# Patient Record
Sex: FEMALE | Race: WHITE | NOT HISPANIC OR LATINO | Employment: OTHER | URBAN - METROPOLITAN AREA
[De-identification: names, ages, dates, MRNs, and addresses within clinical notes are randomized per-mention and may not be internally consistent; named-entity substitution may affect disease eponyms.]

---

## 2017-03-15 ENCOUNTER — HISTORICAL (OUTPATIENT)
Dept: LAB | Facility: HOSPITAL | Age: 42
End: 2017-03-15

## 2018-03-15 ENCOUNTER — HISTORICAL (OUTPATIENT)
Dept: ADMINISTRATIVE | Facility: HOSPITAL | Age: 43
End: 2018-03-15

## 2018-03-15 LAB
ABS NEUT (OLG): 3.56 X10(3)/MCL
ABS NEUT (OLG): 3.59 X10(3)/MCL
ALBUMIN SERPL-MCNC: 3.6 GM/DL (ref 3.4–5)
ALBUMIN/GLOB SERPL: 1 RATIO (ref 1–2)
ALP SERPL-CCNC: 86 UNIT/L (ref 45–117)
ALT SERPL-CCNC: 10 UNIT/L (ref 12–78)
APPEARANCE, UA: ABNORMAL
AST SERPL-CCNC: 16 UNIT/L (ref 15–37)
BACTERIA #/AREA URNS AUTO: ABNORMAL /[HPF]
BASOPHILS NFR BLD MANUAL: 0 %
BASOPHILS NFR BLD MANUAL: 0 %
BILIRUB SERPL-MCNC: 0.3 MG/DL (ref 0.2–1)
BILIRUB UR QL STRIP: NEGATIVE
BILIRUBIN DIRECT+TOT PNL SERPL-MCNC: 0.1 MG/DL
BILIRUBIN DIRECT+TOT PNL SERPL-MCNC: 0.2 MG/DL
BUN SERPL-MCNC: 10 MG/DL (ref 7–18)
CALCIUM SERPL-MCNC: 8.9 MG/DL (ref 8.5–10.1)
CD3+CD4+ CELLS # SPEC: 553 UNIT/L (ref 589–1505)
CD3+CD4+ CELLS NFR BLD: 11.7 % (ref 31–59)
CHLORIDE SERPL-SCNC: 102 MMOL/L (ref 98–107)
CHOLEST SERPL-MCNC: 149 MG/DL
CHOLEST/HDLC SERPL: 3.5 {RATIO} (ref 0–4.4)
CO2 SERPL-SCNC: 28 MMOL/L (ref 21–32)
COLOR UR: YELLOW
CREAT SERPL-MCNC: 0.8 MG/DL (ref 0.6–1.3)
DEPRECATED CALCIDIOL+CALCIFEROL SERPL-MC: 17.42 NG/ML (ref 30–80)
EOSINOPHIL NFR BLD MANUAL: 1 %
EOSINOPHIL NFR BLD MANUAL: 1 %
ERYTHROCYTE [DISTWIDTH] IN BLOOD BY AUTOMATED COUNT: 13.2 % (ref 11.5–14.5)
ERYTHROCYTE [DISTWIDTH] IN BLOOD BY AUTOMATED COUNT: 13.3 % (ref 11.5–14.5)
EST. AVERAGE GLUCOSE BLD GHB EST-MCNC: 105 MG/DL
GLOBULIN SER-MCNC: 5 GM/ML (ref 2.3–3.5)
GLUCOSE (UA): NORMAL
GLUCOSE SERPL-MCNC: 80 MG/DL (ref 74–106)
GRANULOCYTES NFR BLD MANUAL: 42 % (ref 43–75)
GRANULOCYTES NFR BLD MANUAL: 42 % (ref 43–75)
HBA1C MFR BLD: 5.3 % (ref 4.2–6.3)
HCT VFR BLD AUTO: 40.9 % (ref 35–46)
HCT VFR BLD AUTO: 41 % (ref 35–46)
HCV AB SERPL QL IA: NONREACTIVE
HDLC SERPL-MCNC: 42 MG/DL
HGB BLD-MCNC: 13.1 GM/DL (ref 12–16)
HGB BLD-MCNC: 13.2 GM/DL (ref 12–16)
HGB UR QL STRIP: NEGATIVE
HYALINE CASTS #/AREA URNS LPF: ABNORMAL /[LPF]
KETONES UR QL STRIP: ABNORMAL
LDLC SERPL CALC-MCNC: 84 MG/DL (ref 0–130)
LEUKOCYTE ESTERASE UR QL STRIP: 250 LEU/UL
LYMPHOCYTES # BLD AUTO: 4730 UNIT/L (ref 1260–5520)
LYMPHOCYTES NFR BLD MANUAL: 55 % (ref 20.5–51.1)
LYMPHOCYTES NFR BLD MANUAL: 55 % (ref 20.5–51.1)
LYMPHOCYTES NFR LN MANUAL: 55 % (ref 28–48)
LYMPHOMA - T-CELL MARKERS SPEC-IMP: ABNORMAL
MCH RBC QN AUTO: 28.7 PG (ref 26–34)
MCH RBC QN AUTO: 28.9 PG (ref 26–34)
MCHC RBC AUTO-ENTMCNC: 32 GM/DL (ref 31–37)
MCHC RBC AUTO-ENTMCNC: 32.3 GM/DL (ref 31–37)
MCV RBC AUTO: 89.7 FL (ref 80–100)
MCV RBC AUTO: 89.9 FL (ref 80–100)
MONOCYTES NFR BLD MANUAL: 2 % (ref 2–9)
MONOCYTES NFR BLD MANUAL: 2 % (ref 2–9)
MUCOUS THREADS URNS QL MICRO: SLIGHT
NITRITE UR QL STRIP: ABNORMAL
PH UR STRIP: 7 [PH] (ref 4.5–8)
PLATELET # BLD AUTO: 220 X10(3)/MCL (ref 130–400)
PLATELET # BLD AUTO: 221 X10(3)/MCL (ref 130–400)
PLATELET # BLD EST: NORMAL 10*3/UL
PLATELET # BLD EST: NORMAL 10*3/UL
PMV BLD AUTO: 10 FL (ref 7.4–10.4)
PMV BLD AUTO: 10.1 FL (ref 7.4–10.4)
POTASSIUM SERPL-SCNC: 4 MMOL/L (ref 3.5–5.1)
PROT SERPL-MCNC: 8.6 GM/DL (ref 6.4–8.2)
PROT UR QL STRIP: 10 MG/DL
RBC # BLD AUTO: 4.56 X10(6)/MCL (ref 4–5.2)
RBC # BLD AUTO: 4.56 X10(6)/MCL (ref 4–5.2)
RBC #/AREA URNS AUTO: ABNORMAL /[HPF]
RBC MORPH BLD: NORMAL
RBC MORPH BLD: NORMAL
SODIUM SERPL-SCNC: 139 MMOL/L (ref 136–145)
SP GR UR STRIP: 1.01 (ref 1–1.03)
SQUAMOUS #/AREA URNS LPF: >100 /[LPF]
T PALLIDUM AB SER QL: NONREACTIVE
TRIGL SERPL-MCNC: 116 MG/DL
TSH SERPL-ACNC: 1.27 MIU/L (ref 0.36–3.74)
UROBILINOGEN UR STRIP-ACNC: NORMAL
VLDLC SERPL CALC-MCNC: 23 MG/DL
WBC # BLD AUTO: 8600 /MM3 (ref 4500–11500)
WBC # SPEC AUTO: 8.5 X10(3)/MCL (ref 4.5–11)
WBC # SPEC AUTO: 8.6 X10(3)/MCL (ref 4.5–11)
WBC #/AREA URNS AUTO: ABNORMAL /HPF

## 2019-05-09 ENCOUNTER — HISTORICAL (OUTPATIENT)
Dept: RADIOLOGY | Facility: HOSPITAL | Age: 44
End: 2019-05-09

## 2019-08-29 ENCOUNTER — HISTORICAL (OUTPATIENT)
Dept: LAB | Facility: HOSPITAL | Age: 44
End: 2019-08-29

## 2019-11-20 ENCOUNTER — HISTORICAL (OUTPATIENT)
Dept: RADIOLOGY | Facility: HOSPITAL | Age: 44
End: 2019-11-20

## 2020-03-19 ENCOUNTER — HISTORICAL (OUTPATIENT)
Dept: RESPIRATORY THERAPY | Facility: HOSPITAL | Age: 45
End: 2020-03-19

## 2020-10-28 ENCOUNTER — HISTORICAL (OUTPATIENT)
Dept: RADIOLOGY | Facility: HOSPITAL | Age: 45
End: 2020-10-28

## 2022-04-07 ENCOUNTER — HISTORICAL (OUTPATIENT)
Dept: ADMINISTRATIVE | Facility: HOSPITAL | Age: 47
End: 2022-04-07
Payer: MEDICAID

## 2022-04-24 VITALS
BODY MASS INDEX: 38.28 KG/M2 | OXYGEN SATURATION: 98 % | WEIGHT: 195 LBS | SYSTOLIC BLOOD PRESSURE: 124 MMHG | HEIGHT: 60 IN | DIASTOLIC BLOOD PRESSURE: 85 MMHG

## 2022-04-28 NOTE — PROGRESS NOTES
Urine C & S confirm E Coli UTI, resistant to Cipro.  Pt notified, d/c cipro. Replace with Macrobid 100mg bid x 7 days.  Drink lots of water, wipe front to back, urinate before & after sex.  Pt voiced understanding.  Rx sent to pt's pharm of choice.
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
## 2022-07-15 ENCOUNTER — TELEPHONE (OUTPATIENT)
Dept: INFECTIOUS DISEASES | Facility: CLINIC | Age: 47
End: 2022-07-15
Payer: MEDICAID

## 2022-07-15 NOTE — TELEPHONE ENCOUNTER
Called and spoke with patient to schedule an appointment with Amanda. Appointment scheduled for 07/18/2022 at 8:50am. Patient voiced understanding and stated she will call the clinic back if her sister is not able to bring her to the appointment.

## 2022-07-18 ENCOUNTER — OFFICE VISIT (OUTPATIENT)
Dept: INFECTIOUS DISEASES | Facility: CLINIC | Age: 47
End: 2022-07-18
Payer: MEDICAID

## 2022-07-18 VITALS
HEART RATE: 86 BPM | HEIGHT: 60 IN | TEMPERATURE: 98 F | WEIGHT: 185.69 LBS | DIASTOLIC BLOOD PRESSURE: 79 MMHG | RESPIRATION RATE: 16 BRPM | SYSTOLIC BLOOD PRESSURE: 114 MMHG | BODY MASS INDEX: 36.46 KG/M2

## 2022-07-18 DIAGNOSIS — R56.9 SEIZURE-LIKE ACTIVITY: ICD-10-CM

## 2022-07-18 DIAGNOSIS — Z12.39 ENCOUNTER FOR SCREENING FOR MALIGNANT NEOPLASM OF BREAST, UNSPECIFIED SCREENING MODALITY: ICD-10-CM

## 2022-07-18 DIAGNOSIS — B20 HIV DISEASE: Primary | ICD-10-CM

## 2022-07-18 DIAGNOSIS — Z12.11 COLON CANCER SCREENING: ICD-10-CM

## 2022-07-18 LAB
ALBUMIN SERPL-MCNC: 4 GM/DL (ref 3.5–5)
ALBUMIN/GLOB SERPL: 1.1 RATIO (ref 1.1–2)
ALP SERPL-CCNC: 80 UNIT/L (ref 40–150)
ALT SERPL-CCNC: 12 UNIT/L (ref 0–55)
APPEARANCE UR: ABNORMAL
AST SERPL-CCNC: 15 UNIT/L (ref 5–34)
BACTERIA #/AREA URNS AUTO: ABNORMAL /HPF
BASOPHILS # BLD AUTO: 0.02 X10(3)/MCL (ref 0–0.2)
BASOPHILS NFR BLD AUTO: 0.3 %
BILIRUB UR QL STRIP.AUTO: NEGATIVE MG/DL
BILIRUBIN DIRECT+TOT PNL SERPL-MCNC: 0.2 MG/DL
BUN SERPL-MCNC: 13.4 MG/DL (ref 7–18.7)
C TRACH DNA SPEC QL NAA+PROBE: NOT DETECTED
CALCIUM SERPL-MCNC: 9.2 MG/DL (ref 8.4–10.2)
CHLORIDE SERPL-SCNC: 99 MMOL/L (ref 98–107)
CHOLEST SERPL-MCNC: 155 MG/DL
CHOLEST/HDLC SERPL: 3 {RATIO} (ref 0–5)
CO2 SERPL-SCNC: 31 MMOL/L (ref 22–29)
COLOR UR AUTO: ABNORMAL
CREAT SERPL-MCNC: 0.86 MG/DL (ref 0.55–1.02)
DEPRECATED CALCIDIOL+CALCIFEROL SERPL-MC: 21.5 NG/ML (ref 30–80)
EOSINOPHIL # BLD AUTO: 0.03 X10(3)/MCL (ref 0–0.9)
EOSINOPHIL NFR BLD AUTO: 0.5 %
ERYTHROCYTE [DISTWIDTH] IN BLOOD BY AUTOMATED COUNT: 14.1 % (ref 11.5–17)
EST. AVERAGE GLUCOSE BLD GHB EST-MCNC: 105.4 MG/DL
GLOBULIN SER-MCNC: 3.7 GM/DL (ref 2.4–3.5)
GLUCOSE SERPL-MCNC: 94 MG/DL (ref 74–100)
GLUCOSE UR QL STRIP.AUTO: NORMAL MG/DL
HBA1C MFR BLD: 5.3 %
HCT VFR BLD AUTO: 39 % (ref 37–47)
HCV AB SERPL QL IA: NONREACTIVE
HDLC SERPL-MCNC: 48 MG/DL (ref 35–60)
HGB BLD-MCNC: 12.3 GM/DL (ref 12–16)
HYALINE CASTS #/AREA URNS LPF: ABNORMAL /LPF
IMM GRANULOCYTES # BLD AUTO: 0.01 X10(3)/MCL (ref 0–0.04)
IMM GRANULOCYTES NFR BLD AUTO: 0.2 %
KETONES UR QL STRIP.AUTO: ABNORMAL MG/DL
LDLC SERPL CALC-MCNC: 87 MG/DL (ref 50–140)
LEUKOCYTE ESTERASE UR QL STRIP.AUTO: 250 UNIT/L
LYMPHOCYTES # BLD AUTO: 3.6 X10(3)/MCL (ref 0.6–4.6)
LYMPHOCYTES NFR BLD AUTO: 58.8 %
MCH RBC QN AUTO: 28.1 PG (ref 27–31)
MCHC RBC AUTO-ENTMCNC: 31.5 MG/DL (ref 33–36)
MCV RBC AUTO: 89 FL (ref 80–94)
MONOCYTES # BLD AUTO: 0.35 X10(3)/MCL (ref 0.1–1.3)
MONOCYTES NFR BLD AUTO: 5.7 %
MUCOUS THREADS URNS QL MICRO: ABNORMAL /LPF
N GONORRHOEA DNA SPEC QL NAA+PROBE: NOT DETECTED
NEUTROPHILS # BLD AUTO: 2.1 X10(3)/MCL (ref 2.1–9.2)
NEUTROPHILS NFR BLD AUTO: 34.5 %
NITRITE UR QL STRIP.AUTO: ABNORMAL
NRBC BLD AUTO-RTO: 0 %
PH UR STRIP.AUTO: 6 [PH]
PLATELET # BLD AUTO: 219 X10(3)/MCL (ref 130–400)
PMV BLD AUTO: 9.7 FL (ref 7.4–10.4)
POTASSIUM SERPL-SCNC: 4.7 MMOL/L (ref 3.5–5.1)
PROT SERPL-MCNC: 7.7 GM/DL (ref 6.4–8.3)
PROT UR QL STRIP.AUTO: ABNORMAL MG/DL
RBC # BLD AUTO: 4.38 X10(6)/MCL (ref 4.2–5.4)
RBC #/AREA URNS AUTO: ABNORMAL /HPF
RBC UR QL AUTO: ABNORMAL UNIT/L
SODIUM SERPL-SCNC: 138 MMOL/L (ref 136–145)
SP GR UR STRIP.AUTO: 1.03
SQUAMOUS #/AREA URNS LPF: ABNORMAL /HPF
T PALLIDUM AB SER QL: NONREACTIVE
TRIGL SERPL-MCNC: 101 MG/DL (ref 37–140)
TSH SERPL-ACNC: 2.45 UIU/ML (ref 0.35–4.94)
UROBILINOGEN UR STRIP-ACNC: NORMAL MG/DL
VLDLC SERPL CALC-MCNC: 20 MG/DL
WBC # SPEC AUTO: 6.1 X10(3)/MCL (ref 4.5–11.5)
WBC #/AREA URNS AUTO: ABNORMAL /HPF

## 2022-07-18 PROCEDURE — 81001 URINALYSIS AUTO W/SCOPE: CPT | Performed by: NURSE PRACTITIONER

## 2022-07-18 PROCEDURE — 1159F PR MEDICATION LIST DOCUMENTED IN MEDICAL RECORD: ICD-10-PCS | Mod: CPTII,,, | Performed by: NURSE PRACTITIONER

## 2022-07-18 PROCEDURE — 99214 OFFICE O/P EST MOD 30 MIN: CPT | Mod: S$PBB,,, | Performed by: NURSE PRACTITIONER

## 2022-07-18 PROCEDURE — 99214 OFFICE O/P EST MOD 30 MIN: CPT | Mod: PBBFAC | Performed by: NURSE PRACTITIONER

## 2022-07-18 PROCEDURE — 86361 T CELL ABSOLUTE COUNT: CPT | Performed by: NURSE PRACTITIONER

## 2022-07-18 PROCEDURE — 3074F PR MOST RECENT SYSTOLIC BLOOD PRESSURE < 130 MM HG: ICD-10-PCS | Mod: CPTII,,, | Performed by: NURSE PRACTITIONER

## 2022-07-18 PROCEDURE — 1160F RVW MEDS BY RX/DR IN RCRD: CPT | Mod: CPTII,,, | Performed by: NURSE PRACTITIONER

## 2022-07-18 PROCEDURE — 87591 N.GONORRHOEAE DNA AMP PROB: CPT | Performed by: NURSE PRACTITIONER

## 2022-07-18 PROCEDURE — 1159F MED LIST DOCD IN RCRD: CPT | Mod: CPTII,,, | Performed by: NURSE PRACTITIONER

## 2022-07-18 PROCEDURE — 80061 LIPID PANEL: CPT | Performed by: NURSE PRACTITIONER

## 2022-07-18 PROCEDURE — 1160F PR REVIEW ALL MEDS BY PRESCRIBER/CLIN PHARMACIST DOCUMENTED: ICD-10-PCS | Mod: CPTII,,, | Performed by: NURSE PRACTITIONER

## 2022-07-18 PROCEDURE — 80053 COMPREHEN METABOLIC PANEL: CPT | Performed by: NURSE PRACTITIONER

## 2022-07-18 PROCEDURE — 87491 CHLMYD TRACH DNA AMP PROBE: CPT | Performed by: NURSE PRACTITIONER

## 2022-07-18 PROCEDURE — 85025 COMPLETE CBC W/AUTO DIFF WBC: CPT | Performed by: NURSE PRACTITIONER

## 2022-07-18 PROCEDURE — 3078F PR MOST RECENT DIASTOLIC BLOOD PRESSURE < 80 MM HG: ICD-10-PCS | Mod: CPTII,,, | Performed by: NURSE PRACTITIONER

## 2022-07-18 PROCEDURE — 0219U NFCT AGT HIV GNRJ SEQ ALYS: CPT | Performed by: NURSE PRACTITIONER

## 2022-07-18 PROCEDURE — 82306 VITAMIN D 25 HYDROXY: CPT | Performed by: NURSE PRACTITIONER

## 2022-07-18 PROCEDURE — 3074F SYST BP LT 130 MM HG: CPT | Mod: CPTII,,, | Performed by: NURSE PRACTITIONER

## 2022-07-18 PROCEDURE — 84443 ASSAY THYROID STIM HORMONE: CPT | Performed by: NURSE PRACTITIONER

## 2022-07-18 PROCEDURE — 86780 TREPONEMA PALLIDUM: CPT | Performed by: NURSE PRACTITIONER

## 2022-07-18 PROCEDURE — 99214 PR OFFICE/OUTPT VISIT, EST, LEVL IV, 30-39 MIN: ICD-10-PCS | Mod: S$PBB,,, | Performed by: NURSE PRACTITIONER

## 2022-07-18 PROCEDURE — 3008F BODY MASS INDEX DOCD: CPT | Mod: CPTII,,, | Performed by: NURSE PRACTITIONER

## 2022-07-18 PROCEDURE — 3008F PR BODY MASS INDEX (BMI) DOCUMENTED: ICD-10-PCS | Mod: CPTII,,, | Performed by: NURSE PRACTITIONER

## 2022-07-18 PROCEDURE — 86803 HEPATITIS C AB TEST: CPT | Performed by: NURSE PRACTITIONER

## 2022-07-18 PROCEDURE — 36415 COLL VENOUS BLD VENIPUNCTURE: CPT | Performed by: NURSE PRACTITIONER

## 2022-07-18 PROCEDURE — 83036 HEMOGLOBIN GLYCOSYLATED A1C: CPT | Performed by: NURSE PRACTITIONER

## 2022-07-18 PROCEDURE — 3078F DIAST BP <80 MM HG: CPT | Mod: CPTII,,, | Performed by: NURSE PRACTITIONER

## 2022-07-18 PROCEDURE — 87077 CULTURE AEROBIC IDENTIFY: CPT | Performed by: NURSE PRACTITIONER

## 2022-07-18 RX ORDER — QUETIAPINE FUMARATE 300 MG/1
600 TABLET, FILM COATED ORAL NIGHTLY
COMMUNITY
Start: 2022-06-28

## 2022-07-18 RX ORDER — ALPRAZOLAM 2 MG/1
2 TABLET ORAL 2 TIMES DAILY PRN
COMMUNITY
Start: 2022-07-16 | End: 2022-12-17 | Stop reason: ALTCHOICE

## 2022-07-18 RX ORDER — DIVALPROEX SODIUM 500 MG/1
500 TABLET, FILM COATED, EXTENDED RELEASE ORAL 3 TIMES DAILY
COMMUNITY
Start: 2022-07-16

## 2022-07-18 RX ORDER — ABACAVIR SULFATE, DOLUTEGRAVIR SODIUM, LAMIVUDINE 600; 50; 300 MG/1; MG/1; MG/1
1 TABLET, FILM COATED ORAL DAILY
COMMUNITY
Start: 2022-01-31 | End: 2022-07-18 | Stop reason: SDUPTHER

## 2022-07-18 RX ORDER — HYDROXYZINE HYDROCHLORIDE 25 MG/1
TABLET, FILM COATED ORAL
COMMUNITY
Start: 2022-06-20 | End: 2022-12-17 | Stop reason: ALTCHOICE

## 2022-07-18 RX ORDER — ABACAVIR SULFATE, DOLUTEGRAVIR SODIUM, LAMIVUDINE 600; 50; 300 MG/1; MG/1; MG/1
1 TABLET, FILM COATED ORAL DAILY
Qty: 30 TABLET | Refills: 3 | Status: SHIPPED | OUTPATIENT
Start: 2022-07-18 | End: 2023-08-17 | Stop reason: SDUPTHER

## 2022-07-18 NOTE — PROGRESS NOTES
Subjective:       Patient ID: Amy Hanson is a 46 y.o. female.    Chief Complaint: b20 f/u    7/18/22  Amy is a 47 yo HIV + WF presenting today for HIV f/u visit.  She tells me that she has been off Triumeq x 3 months now due to missed appointments.  She states that she was going to the Methadone clinic and was having trouble with transportation getting her here as well.  She is no longer going to Methadone clinic & will be able to attend visits as scheduled.  She has not followed up with Dr. Hooker for cervical pap.  Amenable to having pap smear here, but she is currently on menstrual cycle.  Will plan for pap at next visit in 6 weeks.  Past due for MMG, order placed to schedule at [facility].  She has no family history of colon cancer, no rectal bleeding.  Cologuard ordered for colon cancer screening.  She is not currently taking vitamin d, will check level today.  No new sexual partners, same male partner x 5 years.  She tells me that she has new onset seizure like activity since last visit with me. Will seek PCP evaluation for same ASAP.  All questions answered & concerns addressed.     8/11/21  Amy is a 46 yo HIV + WF evaluated by audio-video telemedicine.  He/She is located at home, and I, the provider, am located at [facility].  We are both located in [place], the [facility] in which I am licensed to practice.  The patient consents to telemedicine visit and is the only individual online.  The patient (or patient's representative) stated that they understood and accepted the privacy and security risks to their information at their location.   She reports 100% adherent to Triumeq, tolerates well with viral suppression. Last labs 4/21 VL <20, Cd4 721.  She agrees to go to [facility] for updated labs today.  She tells me that she is having urinary symptoms of UTI to include frequency, urgency, burning, cloudy & foul smelling urine.  Will order urine with culture & sensitivity today & treat presumptively for  simple UTI.  Voiced appreciation.  She remains in care with PCP SURESH Camejo NP.  She has not been able to see Dr. Hooker & states that she would prefer to have pap smear done at my office. Will plan to perform next visit with face to face scheduling.  Will order MMG to be scheduled at [facility] as well. She is taking vitamin d as well.  She has not yet taken COVID vaccination.  Questions answered & concerns addressed.  She tells me that she will go to [facility] today for mRNA 1st dose vaccine.   Face to face time spent with patient exceeds 20 minutes, over 50% of which was used for education and counseling regarding medical conditions, current medications including risk/benefit and side effects/adverse events, over the counter medications-uses/doses, home self-care and contact precautions, and red flags and indications for immediate medical attention.  The patient is receptive, expresses understanding and is agreeable to plan. All questions answered.     4/21/21  Amy is a 46 yo WF presenting today for HIV return visit, last labs > 1 yr ago.  She tells me that she has been taking Triumeq daily, but ran out a couple of days ago.  She tolerates it well.  Remains in care with PCP SURESH Camejo NP & states that she was taken off Metformin as her Hgb A1c normalized.  She has not seen her GYN  Dr. Hooker in some years, will call for an appointment.  If she is not scheduled or seen by next visit, will plan for pap smear here in office & order MMG.  Pt voiced understanding. She agrees to see a local ophth for routine eye exam & correction.  She denies any new sexual partners, in same relationship x 3 years.  She tells me that transportation has been her biggest obstacle with getting here this past year as medical transportation & family have not been reliable.  She now has a vehicle, working on getting it insured.  Questions answered & concerns addressed regarding COVID vaccination.  Will consider.      Review of  Systems   Constitutional: Negative.    HENT: Negative.    Eyes: Negative.    Respiratory: Negative.    Cardiovascular: Negative.    Gastrointestinal: Negative.    Genitourinary: Negative.    Integumentary:  Negative.   Neurological: Positive for seizures.   Psychiatric/Behavioral: Negative.          Objective:      Physical Exam  Vitals reviewed.   Constitutional:       General: She is not in acute distress.     Appearance: Normal appearance. She is not toxic-appearing.   HENT:      Mouth/Throat:      Mouth: Mucous membranes are moist.      Pharynx: Oropharynx is clear.   Eyes:      Conjunctiva/sclera: Conjunctivae normal.   Cardiovascular:      Rate and Rhythm: Normal rate and regular rhythm.   Pulmonary:      Effort: Pulmonary effort is normal. No respiratory distress.      Breath sounds: Normal breath sounds.   Abdominal:      General: Abdomen is flat. Bowel sounds are normal.      Palpations: Abdomen is soft.   Musculoskeletal:         General: Normal range of motion.      Cervical back: Normal range of motion.   Lymphadenopathy:      Cervical: No cervical adenopathy.   Skin:     General: Skin is warm and dry.   Neurological:      General: No focal deficit present.      Mental Status: She is alert and oriented to person, place, and time. Mental status is at baseline.   Psychiatric:         Mood and Affect: Mood normal.         Behavior: Behavior normal.         Assessment:       Problem List Items Addressed This Visit    None     Visit Diagnoses     HIV disease    -  Primary    Relevant Medications    TRIUMEQ 600- mg Tab    Other Relevant Orders    HIV-1 RNA, Quantitative, PCR with Reflex to Genotype    CD4 T-[place] Cells    Urinalysis    Vitamin D    TSH    Hemoglobin A1C    Hepatitis C Antibody    SYPHILIS ANTIBODY (WITH REFLEX RPR)    Lipid Panel    Chlamydia/GC, PCR    Comprehensive Metabolic Panel    CBC Auto Differential    Quantiferon Gold TB    Colon cancer screening        Relevant Orders     Cologuard Screening (Multitarget Stool DNA)    Encounter for screening for malignant neoplasm of breast, unspecified screening modality        Relevant Orders    Mammo Digital Screening Bilat    Seizure-like activity              Plan:       HIV disease  -     HIV-1 RNA, Quantitative, PCR with Reflex to Genotype; Future; Expected date: 07/18/2022  -     CD4 T-[place] Cells; Future; Expected date: 07/18/2022  -     Urinalysis; Future; Expected date: 07/18/2022  -     Vitamin D; Future; Expected date: 07/18/2022  -     TSH; Future; Expected date: 07/18/2022  -     Hemoglobin A1C; Future; Expected date: 07/18/2022  -     Hepatitis C Antibody; Future; Expected date: 07/18/2022  -     SYPHILIS ANTIBODY (WITH REFLEX RPR); Future; Expected date: 07/18/2022  -     Lipid Panel; Future; Expected date: 07/18/2022  -     Chlamydia/GC, PCR; Future; Expected date: 07/18/2022  -     Comprehensive Metabolic Panel; Future; Expected date: 07/18/2022  -     CBC Auto Differential; Future; Expected date: 07/18/2022  -     Quantiferon Gold TB; Future; Expected date: 07/18/2022  -     TRIUMEQ 600- mg Tab; Take 1 tablet by mouth once daily.  Dispense: 30 tablet; Refill: 3    Adherence and sexual health counseling done.  Use condoms for all sexual encounters.  Blood precautions.  Resume Triumeq 1 po daily as prescribed.  Labs as ordered.  RTC 6 weeks with Amanda.   Pap smears next visit.   Contact PCP asap for evaluation of seizure-like activity.     Wellness:  Cervical pap: ~2019 Dr. Hooker  Anal pap: 9/2019 NIL  Cervical CT/GC: 4/2021 Neg, 7/22  Anal CT/GC: 9/2019 Neg  Oral CT/GC: 9/2019 Neg  Eye exam: 9/2019  MMG: ~2019 [place]  DEXA: N/A  Colon cancer screen:     Colon cancer screening  -     Cologuard Screening (Multitarget Stool DNA); Future; Expected date: 07/18/2022    Encounter for screening for malignant neoplasm of breast, unspecified screening modality  -     Mammo Digital Screening Bilat; Future; Expected date:  08/18/2022  To be scheduled at [facility] per pt request.

## 2022-07-20 LAB
BACTERIA UR CULT: ABNORMAL
GAMMA INTERFERON BACKGROUND BLD IA-ACNC: 0.03 IU/ML
HIV1 RNA # PLAS NAA DL=20: ABNORMAL COPIES/ML
M TB IFN-G BLD-IMP: NEGATIVE
M TB IFN-G CD4+ BCKGRND COR BLD-ACNC: 0 IU/ML
M TB IFN-G CD4+CD8+ BCKGRND COR BLD-ACNC: 0.01 IU/ML
MITOGEN IGNF BCKGRD COR BLD-ACNC: 9.97 IU/ML

## 2022-07-21 DIAGNOSIS — N30.00 ACUTE CYSTITIS WITHOUT HEMATURIA: Primary | ICD-10-CM

## 2022-07-21 RX ORDER — NITROFURANTOIN 25; 75 MG/1; MG/1
100 CAPSULE ORAL 2 TIMES DAILY
Qty: 14 CAPSULE | Refills: 0 | Status: SHIPPED | OUTPATIENT
Start: 2022-07-21 | End: 2022-07-28

## 2022-07-21 NOTE — PROGRESS NOTES
Urine culture positive for E.coli infection.  She will need treatment with Macrobid BID for 7 days. Please notify pt of results.  Encourage her to drink lots of water, do not hold urine for extended time periods, wipe front to back, and urinate before and after sex.  Please encourage condom use as well, HIV viral is 110,000 and she is infectious to anyone that she has intercourse with or is exposed to her blood.  Take HIV medications as prescribed.      Where should I send the antibiotic for bladder infection?

## 2022-07-22 LAB
AGE: 47
CD3+CD4+ CELLS # BLD: 58.8 % (ref 28–48)
CD3+CD4+ CELLS # SPEC: 555.95 UNIT/L (ref 589–1505)
CD3+CD4+ CELLS NFR BLD: 15.5 %
LYMPHOCYTES # BLD AUTO: 3586.8 X10(3)/MCL (ref 1260–5520)
LYMPHOMA - T-CELL MARKERS SPEC-IMP: ABNORMAL
WBC # BLD AUTO: 6100 /MM3 (ref 4500–11500)

## 2022-07-29 LAB
ABACAVIR ISLT GENOTYP: NORMAL
ATAZANAVIR+RITONAVIR ISLT GENOTYP: NORMAL
BICTEGRAVIR ISLT GENOTYP: NORMAL
CABOTEGRAVIR ISLT GENOTYP: NORMAL
DARUNAVIR+RITONAVIR ISLT GENOTYP: NORMAL
DIDANOSINE ISLT GENOTYP: NORMAL
DOLUTEGRAVIR ISLT GENOTYP: NORMAL
DORAVIRINE ISLT GENOTYP: NORMAL
EFAVIRENZ ISLT GENOTYP: NORMAL
ELVITEGRAVIR ISLT GENOTYP: NORMAL
EMTRICITABINE ISLT GENOTYP: NORMAL
ETRAVIRINE ISLT GENOTYP: NORMAL
FOSAMPRENAVIR+RITONAVIR ISLT GENOTYP: NORMAL
HIV 1 RNA GENOTYPE ISLT: NORMAL
HIV 1 RNA RT + PR + IN MUT DET PLAS SEQ: NORMAL
HIV NNRTI GENE MUT DET ISLT: NORMAL
HIV NRTI GENE MUT DET ISLT: NORMAL
HIV PI GENE MUT DET ISLT: NORMAL
HIV1 INTEGRASE GENE MUT DET ISLT: NORMAL
INDINAVIR+RITONAVIR ISLT GENOTYP: NORMAL
LAB AOE PNL PATIENT: NORMAL
LAMIVUDINE ISLT GENOTYP: NORMAL
LOPINAVIR+RITONAVIR ISLT GENOTYP: NORMAL
M INTEGRASE FAILED CODONS: NORMAL
M PROTEASE FAILED CODONS: NORMAL
M REVERSE TRANSCRIPTASE FAILED CODONS: NORMAL
NELFINAVIR ISLT GENOTYP: NORMAL
NEVIRAPINE ISLT GENOTYP: NORMAL
RALTEGRAVIR ISLT GENOTYP: NORMAL
RILPIVIRINE ISLT GENOTYP: NORMAL
SAQUINAVIR+RITONAVIR ISLT GENOTYP: NORMAL
STAVUDINE ISLT GENOTYP: NORMAL
TENOFOVIR ISLT GENOTYP: NORMAL
TIPRANAVIR+RITONAVIR ISLT GENOTYP: NORMAL
ZIDOVUDINE ISLT GENOTYP: NORMAL

## 2022-08-23 ENCOUNTER — HOSPITAL ENCOUNTER (EMERGENCY)
Facility: HOSPITAL | Age: 47
Discharge: HOME | End: 2022-08-23
Attending: EMERGENCY MEDICINE
Payer: MEDICAID

## 2022-08-23 VITALS
BODY MASS INDEX: 35.34 KG/M2 | HEART RATE: 84 BPM | DIASTOLIC BLOOD PRESSURE: 91 MMHG | TEMPERATURE: 98 F | WEIGHT: 180 LBS | SYSTOLIC BLOOD PRESSURE: 152 MMHG | HEIGHT: 60 IN | OXYGEN SATURATION: 100 % | RESPIRATION RATE: 18 BRPM

## 2022-08-23 DIAGNOSIS — E86.0 DEHYDRATION: ICD-10-CM

## 2022-08-23 DIAGNOSIS — A08.4 VIRAL GASTROENTERITIS: Primary | ICD-10-CM

## 2022-08-23 DIAGNOSIS — U07.1 COVID-19: ICD-10-CM

## 2022-08-23 LAB
ALBUMIN SERPL-MCNC: 3.7 GM/DL (ref 3.5–5)
ALBUMIN/GLOB SERPL: 0.9 RATIO (ref 1.1–2)
ALP SERPL-CCNC: 94 UNIT/L (ref 40–150)
ALT SERPL-CCNC: 13 UNIT/L (ref 0–55)
APPEARANCE UR: ABNORMAL
AST SERPL-CCNC: 17 UNIT/L (ref 5–34)
BACTERIA #/AREA URNS AUTO: ABNORMAL /HPF
BASOPHILS # BLD AUTO: 0.01 X10(3)/MCL (ref 0–0.2)
BASOPHILS NFR BLD AUTO: 0.2 %
BILIRUB UR QL STRIP.AUTO: NEGATIVE MG/DL
BILIRUBIN DIRECT+TOT PNL SERPL-MCNC: 0.4 MG/DL
BUN SERPL-MCNC: 9 MG/DL (ref 7–18.7)
CALCIUM SERPL-MCNC: 9.4 MG/DL (ref 8.4–10.2)
CHLORIDE SERPL-SCNC: 106 MMOL/L (ref 98–107)
CO2 SERPL-SCNC: 21 MMOL/L (ref 22–29)
COLOR UR AUTO: YELLOW
CREAT SERPL-MCNC: 0.84 MG/DL (ref 0.55–1.02)
EOSINOPHIL # BLD AUTO: 0 X10(3)/MCL (ref 0–0.9)
EOSINOPHIL NFR BLD AUTO: 0 %
ERYTHROCYTE [DISTWIDTH] IN BLOOD BY AUTOMATED COUNT: 14.8 % (ref 11.5–17)
FLUAV AG UPPER RESP QL IA.RAPID: NOT DETECTED
FLUBV AG UPPER RESP QL IA.RAPID: NOT DETECTED
GFR SERPLBLD CREATININE-BSD FMLA CKD-EPI: >60 MLS/MIN/1.73/M2
GLOBULIN SER-MCNC: 4.1 GM/DL (ref 2.4–3.5)
GLUCOSE SERPL-MCNC: 96 MG/DL (ref 74–100)
GLUCOSE UR QL STRIP.AUTO: NEGATIVE MG/DL
HCT VFR BLD AUTO: 38.1 % (ref 37–47)
HGB BLD-MCNC: 12.9 GM/DL (ref 12–16)
IMM GRANULOCYTES # BLD AUTO: 0.01 X10(3)/MCL (ref 0–0.04)
IMM GRANULOCYTES NFR BLD AUTO: 0.2 %
KETONES UR QL STRIP.AUTO: 40 MG/DL
LEUKOCYTE ESTERASE UR QL STRIP.AUTO: ABNORMAL UNIT/L
LIPASE SERPL-CCNC: 11 U/L
LYMPHOCYTES # BLD AUTO: 1.5 X10(3)/MCL (ref 0.6–4.6)
LYMPHOCYTES NFR BLD AUTO: 33.1 %
MAGNESIUM SERPL-MCNC: 1.9 MG/DL (ref 1.6–2.6)
MCH RBC QN AUTO: 29.1 PG (ref 27–31)
MCHC RBC AUTO-ENTMCNC: 33.9 MG/DL (ref 33–36)
MCV RBC AUTO: 85.8 FL (ref 80–94)
MONOCYTES # BLD AUTO: 0.31 X10(3)/MCL (ref 0.1–1.3)
MONOCYTES NFR BLD AUTO: 6.8 %
NEUTROPHILS # BLD AUTO: 2.7 X10(3)/MCL (ref 2.1–9.2)
NEUTROPHILS NFR BLD AUTO: 59.7 %
NITRITE UR QL STRIP.AUTO: NEGATIVE
PH UR STRIP.AUTO: 5 [PH]
PLATELET # BLD AUTO: 216 X10(3)/MCL (ref 130–400)
PMV BLD AUTO: 9.2 FL (ref 7.4–10.4)
POTASSIUM SERPL-SCNC: 3.5 MMOL/L (ref 3.5–5.1)
PROT SERPL-MCNC: 7.8 GM/DL (ref 6.4–8.3)
PROT UR QL STRIP.AUTO: NEGATIVE MG/DL
RBC # BLD AUTO: 4.44 X10(6)/MCL (ref 4.2–5.4)
RBC #/AREA URNS AUTO: ABNORMAL /HPF
RBC UR QL AUTO: ABNORMAL UNIT/L
SARS-COV-2 RNA RESP QL NAA+PROBE: DETECTED
SODIUM SERPL-SCNC: 140 MMOL/L (ref 136–145)
SP GR UR STRIP.AUTO: 1.02
SQUAMOUS #/AREA URNS AUTO: ABNORMAL /HPF
TROPONIN I SERPL-MCNC: <0.01 NG/ML (ref 0–0.04)
UROBILINOGEN UR STRIP-ACNC: 0.2 MG/DL
WBC # SPEC AUTO: 4.5 X10(3)/MCL (ref 4.5–11.5)
WBC #/AREA URNS AUTO: ABNORMAL /HPF

## 2022-08-23 PROCEDURE — 80053 COMPREHEN METABOLIC PANEL: CPT | Performed by: EMERGENCY MEDICINE

## 2022-08-23 PROCEDURE — 83690 ASSAY OF LIPASE: CPT | Performed by: EMERGENCY MEDICINE

## 2022-08-23 PROCEDURE — 83735 ASSAY OF MAGNESIUM: CPT | Performed by: EMERGENCY MEDICINE

## 2022-08-23 PROCEDURE — 36415 COLL VENOUS BLD VENIPUNCTURE: CPT | Performed by: EMERGENCY MEDICINE

## 2022-08-23 PROCEDURE — 99284 EMERGENCY DEPT VISIT MOD MDM: CPT | Mod: 25

## 2022-08-23 PROCEDURE — 96361 HYDRATE IV INFUSION ADD-ON: CPT

## 2022-08-23 PROCEDURE — 96374 THER/PROPH/DIAG INJ IV PUSH: CPT

## 2022-08-23 PROCEDURE — 96375 TX/PRO/DX INJ NEW DRUG ADDON: CPT

## 2022-08-23 PROCEDURE — 85025 COMPLETE CBC W/AUTO DIFF WBC: CPT | Performed by: EMERGENCY MEDICINE

## 2022-08-23 PROCEDURE — 81001 URINALYSIS AUTO W/SCOPE: CPT | Performed by: EMERGENCY MEDICINE

## 2022-08-23 PROCEDURE — 84484 ASSAY OF TROPONIN QUANT: CPT | Performed by: EMERGENCY MEDICINE

## 2022-08-23 PROCEDURE — 63600175 PHARM REV CODE 636 W HCPCS: Performed by: EMERGENCY MEDICINE

## 2022-08-23 PROCEDURE — 87636 SARSCOV2 & INF A&B AMP PRB: CPT | Performed by: EMERGENCY MEDICINE

## 2022-08-23 PROCEDURE — 25000003 PHARM REV CODE 250: Performed by: EMERGENCY MEDICINE

## 2022-08-23 RX ORDER — DIPHENOXYLATE HYDROCHLORIDE AND ATROPINE SULFATE 2.5; .025 MG/1; MG/1
2 TABLET ORAL 4 TIMES DAILY PRN
Qty: 24 TABLET | Refills: 0 | Status: SHIPPED | OUTPATIENT
Start: 2022-08-23 | End: 2022-08-26

## 2022-08-23 RX ORDER — DIPHENHYDRAMINE HYDROCHLORIDE 50 MG/ML
25 INJECTION INTRAMUSCULAR; INTRAVENOUS
Status: COMPLETED | OUTPATIENT
Start: 2022-08-23 | End: 2022-08-23

## 2022-08-23 RX ORDER — ONDANSETRON 8 MG/1
8 TABLET, ORALLY DISINTEGRATING ORAL EVERY 6 HOURS PRN
Qty: 20 TABLET | Refills: 0 | Status: SHIPPED | OUTPATIENT
Start: 2022-08-23 | End: 2022-08-28

## 2022-08-23 RX ORDER — DROPERIDOL 2.5 MG/ML
2.5 INJECTION, SOLUTION INTRAMUSCULAR; INTRAVENOUS
Status: COMPLETED | OUTPATIENT
Start: 2022-08-23 | End: 2022-08-23

## 2022-08-23 RX ADMIN — DROPERIDOL 2.5 MG: 2.5 INJECTION, SOLUTION INTRAMUSCULAR; INTRAVENOUS at 10:08

## 2022-08-23 RX ADMIN — SODIUM CHLORIDE 1000 ML: 9 INJECTION, SOLUTION INTRAVENOUS at 10:08

## 2022-08-23 RX ADMIN — DIPHENHYDRAMINE HYDROCHLORIDE 25 MG: 50 INJECTION, SOLUTION INTRAMUSCULAR; INTRAVENOUS at 10:08

## 2022-08-23 NOTE — ED PROVIDER NOTES
Encounter Date: 8/23/2022       History     Chief Complaint   Patient presents with    Nausea    Vomiting    Diarrhea     N,V,D   started 2-3 days ago   hx HIV     The history is provided by the patient. No  was used.   Nausea  This is a new problem. The current episode started more than 2 days ago. The problem occurs constantly. Pertinent negatives include no chest pain, no abdominal pain, no headaches and no shortness of breath. Nothing aggravates the symptoms. Nothing relieves the symptoms.   Vomiting   Associated symptoms include diarrhea. Pertinent negatives include no abdominal pain, no fever and no headaches.   Diarrhea   Associated symptoms include vomiting. Pertinent negatives include no abdominal pain, fever or headaches.   Symptom onset 3-4 days ago.  Feels dehydrated.  Denies abd pain.  Denies contaminated food, though friend thinks she may have eaten spoiled leftovers.  No sick contacts.    Review of patient's allergies indicates:  No Known Allergies  Past Medical History:   Diagnosis Date    Anemia     Bipolar disorder     Depression     HIV infection     Mitral valve prolapse     Seizures      Past Surgical History:   Procedure Laterality Date    FACIAL FRACTURE SURGERY      TUBAL LIGATION       Family History   Problem Relation Age of Onset    Heart disease Mother     Heart attack Mother     Hypertension Father     COPD Sister     Mental retardation Sister      Social History     Tobacco Use    Smoking status: Current Every Day Smoker     Packs/day: 0.50     Types: Cigarettes    Smokeless tobacco: Never Used   Substance Use Topics    Alcohol use: Not Currently    Drug use: Not Currently     Review of Systems   Constitutional: Negative for fever.   HENT: Negative for sore throat.    Respiratory: Negative for shortness of breath.    Cardiovascular: Negative for chest pain.   Gastrointestinal: Positive for diarrhea, nausea and vomiting. Negative for abdominal  pain.   Genitourinary: Negative for dysuria.   Musculoskeletal: Negative for back pain.   Skin: Negative for rash.   Neurological: Negative for weakness and headaches.   Hematological: Does not bruise/bleed easily.       Physical Exam     Initial Vitals [08/23/22 0949]   BP Pulse Resp Temp SpO2   (!) 158/117 82 18 98.1 °F (36.7 °C) 98 %      MAP       --         Physical Exam    Nursing note and vitals reviewed.  Constitutional: She appears well-developed and well-nourished.   HENT:   Head: Normocephalic and atraumatic.   Right Ear: External ear normal.   Left Ear: External ear normal.   Eyes: Conjunctivae and EOM are normal. Pupils are equal, round, and reactive to light.   Neck: Neck supple.   Normal range of motion.  Cardiovascular: Normal rate, regular rhythm, normal heart sounds and intact distal pulses.   Pulmonary/Chest: Breath sounds normal.   Abdominal: Abdomen is soft. Bowel sounds are normal.   Musculoskeletal:         General: Normal range of motion.      Cervical back: Normal range of motion and neck supple.     Neurological: She is alert and oriented to person, place, and time. GCS score is 15. GCS eye subscore is 4. GCS verbal subscore is 5. GCS motor subscore is 6.   Skin: Skin is warm and dry. Capillary refill takes less than 2 seconds.   Psychiatric: Her behavior is normal. Judgment and thought content normal. Her mood appears anxious.         ED Course   Procedures  Labs Reviewed   COMPREHENSIVE METABOLIC PANEL - Abnormal; Notable for the following components:       Result Value    Carbon Dioxide 21 (*)     Globulin 4.1 (*)     Albumin/Globulin Ratio 0.9 (*)     All other components within normal limits   COVID/FLU A&B PCR - Abnormal; Notable for the following components:    SARS-CoV-2 PCR Detected (*)     All other components within normal limits   URINALYSIS, REFLEX TO URINE CULTURE - Abnormal; Notable for the following components:    Appearance, UA Slightly Cloudy (*)     Ketones, UA 40  (*)      Blood, UA Small (*)     Leukocyte Esterase, UA Small (*)     All other components within normal limits   URINALYSIS, MICROSCOPIC - Abnormal; Notable for the following components:    Bacteria, UA Few (*)     Squamous Epithelial Cells, UA Moderate (*)     All other components within normal limits   LIPASE - Normal   MAGNESIUM - Normal   TROPONIN I - Normal   CBC W/ AUTO DIFFERENTIAL    Narrative:     The following orders were created for panel order CBC auto differential.  Procedure                               Abnormality         Status                     ---------                               -----------         ------                     CBC with Differential[[id number]]                            Final result                 Please view results for these tests on the individual orders.   CBC WITH DIFFERENTIAL          Imaging Results    None          Medications   sodium chloride 0.9% bolus 1,000 mL (0 mLs Intravenous Stopped 8/23/22 1134)   droperidoL injection 2.5 mg (2.5 mg Intravenous Given 8/23/22 1035)   diphenhydrAMINE injection 25 mg (25 mg Intravenous Given 8/23/22 1034)      1210: feeling much better after meds, IVF                    Clinical Impression:   Final diagnoses:  [A08.4] Viral gastroenteritis (Primary)  [E86.0] Dehydration  [U07.1] COVID-19          ED Disposition Condition    Discharge Stable        ED Prescriptions     Medication Sig Dispense Start Date End Date Auth. Provider    ondansetron (ZOFRAN-ODT) 8 MG TbDL Take 1 tablet (8 mg total) by mouth every 6 (six) hours as needed (nausea). 20 tablet 8/23/2022 8/28/2022 Camilo Yu MD    diphenoxylate-atropine 2.5-0.025 mg (LOMOTIL) 2.5-0.025 mg per tablet Take 2 tablets by mouth 4 (four) times daily as needed for Diarrhea. 24 tablet 8/23/2022 8/26/2022 Camilo Yu MD        Follow-up Information     Follow up With Specialties Details Why Contact Info    Follow up with your primary care provider in 2 weeks if not  improved               Camilo Yu MD  08/23/22 7577

## 2022-12-17 ENCOUNTER — HOSPITAL ENCOUNTER (EMERGENCY)
Facility: HOSPITAL | Age: 47
Discharge: HOME | End: 2022-12-17
Attending: INTERNAL MEDICINE
Payer: MEDICAID

## 2022-12-17 VITALS
DIASTOLIC BLOOD PRESSURE: 87 MMHG | HEART RATE: 100 BPM | WEIGHT: 200 LBS | BODY MASS INDEX: 39.06 KG/M2 | SYSTOLIC BLOOD PRESSURE: 144 MMHG | OXYGEN SATURATION: 100 % | RESPIRATION RATE: 18 BRPM | TEMPERATURE: 98 F

## 2022-12-17 DIAGNOSIS — J20.9 ACUTE BRONCHITIS, UNSPECIFIED ORGANISM: Primary | ICD-10-CM

## 2022-12-17 DIAGNOSIS — R05.9 COUGH: ICD-10-CM

## 2022-12-17 PROBLEM — Z21 HIV INFECTION: Status: ACTIVE | Noted: 2022-12-17

## 2022-12-17 PROBLEM — F41.9 ANXIETY: Status: ACTIVE | Noted: 2022-12-17

## 2022-12-17 PROBLEM — M54.2 CHRONIC NECK PAIN: Status: ACTIVE | Noted: 2022-12-17

## 2022-12-17 PROBLEM — F32.A DEPRESSIVE DISORDER: Status: ACTIVE | Noted: 2022-12-17

## 2022-12-17 PROBLEM — M54.9 BACK PAIN: Status: ACTIVE | Noted: 2022-12-17

## 2022-12-17 PROBLEM — R56.9 SEIZURE: Status: ACTIVE | Noted: 2022-12-17

## 2022-12-17 PROBLEM — B20 HIV INFECTION: Status: ACTIVE | Noted: 2022-12-17

## 2022-12-17 PROBLEM — A63.0 HPV (HUMAN PAPILLOMA VIRUS) ANOGENITAL INFECTION: Status: ACTIVE | Noted: 2022-12-17

## 2022-12-17 PROBLEM — E78.5 HYPERLIPIDEMIA: Status: ACTIVE | Noted: 2022-12-17

## 2022-12-17 PROBLEM — F31.9 BIPOLAR I DISORDER WITH DEPRESSION: Status: ACTIVE | Noted: 2022-12-17

## 2022-12-17 PROBLEM — G89.29 CHRONIC NECK PAIN: Status: ACTIVE | Noted: 2022-12-17

## 2022-12-17 LAB
ALBUMIN SERPL-MCNC: 3.8 G/DL (ref 3.5–5)
ALBUMIN/GLOB SERPL: 0.9 RATIO (ref 1.1–2)
ALP SERPL-CCNC: 83 UNIT/L (ref 40–150)
ALT SERPL-CCNC: 12 UNIT/L (ref 0–55)
AST SERPL-CCNC: 17 UNIT/L (ref 5–34)
BASOPHILS # BLD AUTO: 0.02 X10(3)/MCL (ref 0–0.2)
BASOPHILS NFR BLD AUTO: 0.3 %
BILIRUBIN DIRECT+TOT PNL SERPL-MCNC: 0.2 MG/DL
BUN SERPL-MCNC: 8 MG/DL (ref 7–18.7)
CALCIUM SERPL-MCNC: 9.9 MG/DL (ref 8.4–10.2)
CHLORIDE SERPL-SCNC: 101 MMOL/L (ref 98–107)
CO2 SERPL-SCNC: 27 MMOL/L (ref 22–29)
CREAT SERPL-MCNC: 0.8 MG/DL (ref 0.55–1.02)
EOSINOPHIL # BLD AUTO: 0.06 X10(3)/MCL (ref 0–0.9)
EOSINOPHIL NFR BLD AUTO: 0.8 %
ERYTHROCYTE [DISTWIDTH] IN BLOOD BY AUTOMATED COUNT: 13.6 % (ref 11–14.5)
GFR SERPLBLD CREATININE-BSD FMLA CKD-EPI: >60 MLS/MIN/1.73/M2
GLOBULIN SER-MCNC: 4.3 GM/DL (ref 2.4–3.5)
GLUCOSE SERPL-MCNC: 89 MG/DL (ref 74–100)
HCT VFR BLD AUTO: 38.6 % (ref 37–47)
HGB BLD-MCNC: 12.2 GM/DL (ref 12–16)
IMM GRANULOCYTES # BLD AUTO: 0.06 X10(3)/MCL (ref 0–0.04)
IMM GRANULOCYTES NFR BLD AUTO: 0.8 %
LYMPHOCYTES # BLD AUTO: 3.93 X10(3)/MCL (ref 0.6–4.6)
LYMPHOCYTES NFR BLD AUTO: 50.4 %
MCH RBC QN AUTO: 28.9 PG
MCHC RBC AUTO-ENTMCNC: 31.6 MG/DL (ref 33–36)
MCV RBC AUTO: 91.5 FL (ref 80–94)
MONOCYTES # BLD AUTO: 0.49 X10(3)/MCL (ref 0.1–1.3)
MONOCYTES NFR BLD AUTO: 6.3 %
NEUTROPHILS # BLD AUTO: 3.23 X10(3)/MCL (ref 2.1–9.2)
NEUTROPHILS NFR BLD AUTO: 41.4 %
PLATELET # BLD AUTO: 279 X10(3)/MCL (ref 140–371)
PMV BLD AUTO: 9.4 FL (ref 9.4–12.4)
POTASSIUM SERPL-SCNC: 4.1 MMOL/L (ref 3.5–5.1)
PROT SERPL-MCNC: 8.1 GM/DL (ref 6.4–8.3)
RBC # BLD AUTO: 4.22 X10(6)/MCL (ref 4.2–5.4)
SODIUM SERPL-SCNC: 140 MMOL/L (ref 136–145)
WBC # SPEC AUTO: 7.8 X10(3)/MCL (ref 4.5–11.5)

## 2022-12-17 PROCEDURE — 80053 COMPREHEN METABOLIC PANEL: CPT | Performed by: INTERNAL MEDICINE

## 2022-12-17 PROCEDURE — 85025 COMPLETE CBC W/AUTO DIFF WBC: CPT | Performed by: INTERNAL MEDICINE

## 2022-12-17 PROCEDURE — 99284 EMERGENCY DEPT VISIT MOD MDM: CPT | Mod: 25

## 2022-12-17 RX ORDER — FLUOXETINE HYDROCHLORIDE 40 MG/1
40 CAPSULE ORAL
COMMUNITY
Start: 2022-12-05

## 2022-12-17 RX ORDER — ALBUTEROL SULFATE 90 UG/1
2 AEROSOL, METERED RESPIRATORY (INHALATION) EVERY 6 HOURS PRN
Qty: 18 G | Refills: 0 | Status: SHIPPED | OUTPATIENT
Start: 2022-12-17 | End: 2024-03-25

## 2022-12-17 NOTE — ED PROVIDER NOTES
12/17/2022         4:59 PM    Source of History:  History obtained from the patient.       Chief complaint:  From Nurse Triage:  Cough (C/o productive cough x 4 days. Tested negative for covid/flu at urgent care, sent here for chest xray and further work up)    HISTORY OF PRESENT ILLNES:  Amy Hanson is a 47 y.o. female presenting with Cough (C/o productive cough x 4 days. Tested negative for covid/flu at urgent care, sent here for chest xray and further work up)       Patient with history of HIV, last CD4 count was more than 1000, comes to the emergency room with complaint of cough cold and congestion which is going on for 4 days, she went to the urgent care they did a COVID and flu test which was negative and they told her to come to the emergency room to get checked for pneumonia and bronchitis patient says she was diagnosed with COPD in 2005 but she is not taking any medicine for that.  She is taking her HIV medicine religiously, and she is also taking her depression and anxiety medicines.    REVIEW OF SYSTEMS:   Constitutional symptoms:  No Fever. No Chills    Skin symptoms:  No Rash.    Eye symptoms:  No Visual disturbance reported.   ENMT symptoms:  No Sore throat,  Sinus Congestion  Respiratory symptoms:  No Shortness of Breath, Cough, no Wheezing.    Cardiovascular symptoms:  No Chest Pain, No Palpitations, No Tachycardia.    Gastrointestinal symptoms:  No Abdominal Pain, No Nausea, No Vomiting, No Diarrhea, No Constipation.    Genitourinary symptoms:  No Dysuria,    Musculoskeletal symptoms:  No Back pain,    Neurologic symptoms:  No Headache, No Dizziness.    Psychiatric symptoms:  No Anxiety, No Depression, No Substance Abuse.              Additional review of systems information: Patient Denies Any Other Complaints.    All Other Systems Reviewed With Patient And Negative.    ALLEGIES:  Review of patient's allergies indicates:  No Known Allergies    MEDICINE LIST:  Current Outpatient Medications    Medication Instructions    albuterol (PROVENTIL HFA) 90 mcg/actuation inhaler 2 puffs, Inhalation, Every 6 hours PRN, Rescue    divalproex 500 mg, Oral, 3 times daily    FLUoxetine 40 mg, Oral    QUEtiapine (SEROQUEL) 600 mg, Oral, Nightly    TRIUMEQ 600- mg Tab 1 tablet, Oral, Daily        PMH:  As per HPI and below:    Reviewed and updated in chart.    PAST MEDICAL HISTORY:  Past Medical History:   Diagnosis Date    Anemia     Bipolar disorder     Depression     HIV infection     Mitral valve prolapse     Seizures         PAST SURGICAL HISTORY:  Past Surgical History:   Procedure Laterality Date    FACIAL FRACTURE SURGERY      TUBAL LIGATION         SOCIAL HISTORY:  Social History     Tobacco Use    Smoking status: Every Day     Packs/day: 0.50     Types: Cigarettes    Smokeless tobacco: Never   Substance Use Topics    Alcohol use: Not Currently    Drug use: Not Currently       FAMILY HISTORY:  Family History   Problem Relation Age of Onset    Heart disease Mother     Heart attack Mother     Hypertension Father     COPD Sister     Mental retardation Sister         PROBLEM LIST:  Patient Active Problem List   Diagnosis    Anxiety    Bipolar I disorder with depression    Depressive disorder    Seizure    Hyperlipidemia    HIV infection    Back pain    HPV (human papilloma virus) anogenital infection    Chronic neck pain        PHYSICAL EXAM:      ED Triage Vitals [12/17/22 1645]   BP (!) 156/98   Pulse (!) 118   Resp 18   Temp 98.3 °F (36.8 °C)   SpO2 98 %        Vital Signs: Reviewed As In Chart.  General:  Alert, No Cardiorespiratory Distress Noted.   Skin: Normal For Ethnic Origin  Eye:  Extraocular Movements Are Intact.   ENT: Mucus membranes are moist.   Cardiovascular:  Regular Rate And Rhythm, No Murmur, No Pedal Edema.    Respiratory:  Respirations Nonlabored, No Respiratory Distress, Good Bilateral Air Entry, No Rales, No Rhonchi.    Musculoskeletal:  No Gross Deformity Noted.   Gastrointestinal:   Soft, Non Distended, Non Tenderness, Normal Bowel Sounds.    Neurological:  Alert And Oriented To Person, Place, Time, And Situation, Normal Motor Observed, Normal Speech Observed.    Psychiatric:  Cooperative, Appropriate Mood & Affect.    INITIAL IMPRESSION/ DIFFERENTIAL DX:      MEDICAL DECISION MAKING:      Reviewed Nurses Note. Reviewed Vital Signs.     Reviewed Pertinent old records, History and updated as necessary.    47 y.o. female with Cough (C/o productive cough x 4 days. Tested negative for covid/flu at urgent care, sent here for chest xray and further work up)    Patient with HIV comes to the emergency with cough:  Congestion going on for 4 days, her last CD4 count is more than 1000, her HIV is nondetectable.  I am going to do a chest x-ray CBC CMP and decide further.  Her COVID and flu test is negative today    ED WORKUP AND COURSE:  ED ORDERS:  Orders Placed This Encounter   Procedures    X-Ray Chest PA And Lateral    CBC auto differential    Comprehensive metabolic panel    CBC with Differential       ED MEDICINES:  Medications - No data to display             ED LABS ORDERED AND REVIEWED:  Admission on 12/17/2022   Component Date Value Ref Range Status    Sodium Level 12/17/2022 140  136 - 145 mmol/L Final    Potassium Level 12/17/2022 4.1  3.5 - 5.1 mmol/L Final    Chloride 12/17/2022 101  98 - 107 mmol/L Final    Carbon Dioxide 12/17/2022 27  22 - 29 mmol/L Final    Glucose Level 12/17/2022 89  74 - 100 mg/dL Final    Blood Urea Nitrogen 12/17/2022 8.0  7.0 - 18.7 mg/dL Final    Creatinine 12/17/2022 0.80  0.55 - 1.02 mg/dL Final    Calcium Level Total 12/17/2022 9.9  8.4 - 10.2 mg/dL Final    Protein Total 12/17/2022 8.1  6.4 - 8.3 gm/dL Final    Albumin Level 12/17/2022 3.8  3.5 - 5.0 g/dL Final    Globulin 12/17/2022 4.3 (H)  2.4 - 3.5 gm/dL Final    Albumin/Globulin Ratio 12/17/2022 0.9 (L)  1.1 - 2.0 ratio Final    Bilirubin Total 12/17/2022 0.2  <=1.5 mg/dL Final    Alkaline Phosphatase  12/17/2022 83  40 - 150 unit/L Final    Alanine Aminotransferase 12/17/2022 12  0 - 55 unit/L Final    Aspartate Aminotransferase 12/17/2022 17  5 - 34 unit/L Final    eGFR 12/17/2022 >60  mls/min/1.73/m2 Final    WBC 12/17/2022 7.8  4.5 - 11.5 x10(3)/mcL Final    RBC 12/17/2022 4.22  4.20 - 5.40 x10(6)/mcL Final    Hgb 12/17/2022 12.2  12.0 - 16.0 gm/dL Final    Hct 12/17/2022 38.6  37.0 - 47.0 % Final    MCV 12/17/2022 91.5  80.0 - 94.0 fL Final    MCH 12/17/2022 28.9  pg Final    MCHC 12/17/2022 31.6 (L)  33.0 - 36.0 mg/dL Final    RDW 12/17/2022 13.6  11.0 - 14.5 % Final    Platelet 12/17/2022 279  140 - 371 x10(3)/mcL Final    MPV 12/17/2022 9.4  9.4 - 12.4 fL Final    Neut % 12/17/2022 41.4  % Final    Lymph % 12/17/2022 50.4  % Final    Mono % 12/17/2022 6.3  % Final    Eos % 12/17/2022 0.8  % Final    Basophil % 12/17/2022 0.3  % Final    Lymph # 12/17/2022 3.93  0.6 - 4.6 x10(3)/mcL Final    Neut # 12/17/2022 3.23  2.1 - 9.2 x10(3)/mcL Final    Mono # 12/17/2022 0.49  0.1 - 1.3 x10(3)/mcL Final    Eos # 12/17/2022 0.06  0 - 0.9 x10(3)/mcL Final    Baso # 12/17/2022 0.02  0 - 0.2 x10(3)/mcL Final    IG# 12/17/2022 0.06 (H)  0 - 0.04 x10(3)/mcL Final    IG% 12/17/2022 0.8  % Final       RADIOLOGY STUDIES ORDERED AND REVIEWED:  Imaging Results              X-Ray Chest PA And Lateral (Preliminary result)  Result time 12/17/22 17:26:24      ED Interpretation by Janie St MD (12/17/22 17:26:24, [facility] - Emergency Dept, Emergency Medicine)    X-ray chest Two view:  No focal consolidation, no acute cardiothoracic abnormality identified                                    ED COURSE AND REEVALUATIONS:  Vitals:    12/17/22 1645   BP: (!) 156/98   Pulse: (!) 118   Resp: 18   Temp: 98.3 °F (36.8 °C)       PROCEDURES PERFORMED IN ED:  Procedures    ED Course as of 12/17/22 1735   Sat Dec 17, 2022   1725 WBC: 7.8 [GQ]   1726 HEMOGLOBIN: 12.2 [GQ]   1734 Patient does not have a pneumonia, her white  blood cell count is 7.8, chemistry overall is normal, I am going to put her on albuterol will advise her to stop smoking and stop vaping and stop smoking marijuana and see her family doctor for follow-up. [GQ]      ED Course User Index  [GQ] Janie St MD              DIAGNOSTIC IMPRESSION:      1. Acute bronchitis, unspecified organism    2. Cough               Medication List        START taking these medications      albuterol 90 mcg/actuation inhaler  Commonly known as: PROVENTIL HFA  Inhale 2 puffs into the lungs every 6 (six) hours as needed for Wheezing. Rescue            ASK your doctor about these medications      divalproex 500 MG Tb24     FLUoxetine 40 MG capsule     QUEtiapine 300 MG Tab  Commonly known as: SEROQUEL     TRIUMEQ 600- mg Tab  Generic drug: abacavir-dolutegravir-lamivud  Take 1 tablet by mouth once daily.               Where to Get Your Medications        These medications were sent to [id number] - [place], [place] - [address] AT [facility] [address] [facility] [address] [place] [address]  [address], [place] [address]      Phone: [phone]   albuterol 90 mcg/actuation inhaler           Follow-up Information       Francisca Camejo NP In 2 days.    Specialty: Family Medicine  Contact information:  [address] [facility] [address]                                      Janie St MD  12/17/22 7247

## 2023-01-27 ENCOUNTER — HOSPITAL ENCOUNTER (OUTPATIENT)
Dept: RADIOLOGY | Facility: HOSPITAL | Age: 48
Discharge: HOME | End: 2023-01-27
Payer: MEDICAID

## 2023-01-27 DIAGNOSIS — M79.645 PAIN IN LEFT FINGER(S): ICD-10-CM

## 2023-01-27 PROCEDURE — 73130 X-RAY EXAM OF HAND: CPT | Mod: TC,LT

## 2023-04-28 ENCOUNTER — TELEPHONE (OUTPATIENT)
Dept: INFECTIOUS DISEASES | Facility: CLINIC | Age: 48
End: 2023-04-28
Payer: MEDICAID

## 2023-04-28 NOTE — TELEPHONE ENCOUNTER
Attempted to contact patient to schedule an appointment with Amanda Leon NP. No answer. No option to leave voicemail. Unable to reach letter mailed to patient.      07/18/2022 Amanda

## 2023-06-03 ENCOUNTER — HOSPITAL ENCOUNTER (EMERGENCY)
Facility: HOSPITAL | Age: 48
Discharge: HOME | End: 2023-06-03
Attending: STUDENT IN AN ORGANIZED HEALTH CARE EDUCATION/TRAINING PROGRAM
Payer: MEDICAID

## 2023-06-03 VITALS
DIASTOLIC BLOOD PRESSURE: 100 MMHG | TEMPERATURE: 98 F | HEART RATE: 92 BPM | WEIGHT: 140 LBS | RESPIRATION RATE: 18 BRPM | SYSTOLIC BLOOD PRESSURE: 125 MMHG | OXYGEN SATURATION: 95 % | HEIGHT: 64 IN | BODY MASS INDEX: 23.9 KG/M2

## 2023-06-03 DIAGNOSIS — F11.10 HEROIN ABUSE: ICD-10-CM

## 2023-06-03 DIAGNOSIS — R06.02 SOB (SHORTNESS OF BREATH): ICD-10-CM

## 2023-06-03 DIAGNOSIS — T50.901A ACCIDENTAL OVERDOSE, INITIAL ENCOUNTER: Primary | ICD-10-CM

## 2023-06-03 DIAGNOSIS — T40.601A OPIATE OVERDOSE, ACCIDENTAL OR UNINTENTIONAL, INITIAL ENCOUNTER: ICD-10-CM

## 2023-06-03 LAB
ALBUMIN SERPL-MCNC: 4 G/DL (ref 3.5–5)
ALBUMIN/GLOB SERPL: 1.1 RATIO (ref 1.1–2)
ALP SERPL-CCNC: 76 UNIT/L (ref 40–150)
ALT SERPL-CCNC: 48 UNIT/L (ref 0–55)
AST SERPL-CCNC: 64 UNIT/L (ref 5–34)
BASOPHILS # BLD AUTO: 0.03 X10(3)/MCL
BASOPHILS NFR BLD AUTO: 0.4 %
BILIRUBIN DIRECT+TOT PNL SERPL-MCNC: 0.5 MG/DL
BNP BLD-MCNC: <10 PG/ML
BUN SERPL-MCNC: 13.2 MG/DL (ref 7–18.7)
CALCIUM SERPL-MCNC: 9 MG/DL (ref 8.4–10.2)
CHLORIDE SERPL-SCNC: 106 MMOL/L (ref 98–107)
CO2 SERPL-SCNC: 18 MMOL/L (ref 22–29)
CREAT SERPL-MCNC: 1.09 MG/DL (ref 0.55–1.02)
EOSINOPHIL # BLD AUTO: 0.02 X10(3)/MCL (ref 0–0.9)
EOSINOPHIL NFR BLD AUTO: 0.3 %
ERYTHROCYTE [DISTWIDTH] IN BLOOD BY AUTOMATED COUNT: 13.4 % (ref 11.5–17)
ETHANOL SERPL-MCNC: <10 MG/DL
FLUAV AG UPPER RESP QL IA.RAPID: NOT DETECTED
FLUBV AG UPPER RESP QL IA.RAPID: NOT DETECTED
GFR SERPLBLD CREATININE-BSD FMLA CKD-EPI: >60 MLS/MIN/1.73/M2
GLOBULIN SER-MCNC: 3.6 GM/DL (ref 2.4–3.5)
GLUCOSE SERPL-MCNC: 167 MG/DL (ref 74–100)
HCT VFR BLD AUTO: 37.7 % (ref 37–47)
HGB BLD-MCNC: 12.3 G/DL (ref 12–16)
IMM GRANULOCYTES # BLD AUTO: 0.02 X10(3)/MCL (ref 0–0.04)
IMM GRANULOCYTES NFR BLD AUTO: 0.3 %
LYMPHOCYTES # BLD AUTO: 1.88 X10(3)/MCL (ref 0.6–4.6)
LYMPHOCYTES NFR BLD AUTO: 26.2 %
MCH RBC QN AUTO: 29.5 PG (ref 27–31)
MCHC RBC AUTO-ENTMCNC: 32.6 G/DL (ref 33–36)
MCV RBC AUTO: 90.4 FL (ref 80–94)
MONOCYTES # BLD AUTO: 0.49 X10(3)/MCL (ref 0.1–1.3)
MONOCYTES NFR BLD AUTO: 6.8 %
NEUTROPHILS # BLD AUTO: 4.73 X10(3)/MCL (ref 2.1–9.2)
NEUTROPHILS NFR BLD AUTO: 66 %
NRBC BLD AUTO-RTO: 0 %
PLATELET # BLD AUTO: 194 X10(3)/MCL (ref 130–400)
PMV BLD AUTO: 10 FL (ref 7.4–10.4)
POTASSIUM SERPL-SCNC: 3.6 MMOL/L (ref 3.5–5.1)
PROT SERPL-MCNC: 7.6 GM/DL (ref 6.4–8.3)
RBC # BLD AUTO: 4.17 X10(6)/MCL (ref 4.2–5.4)
SARS-COV-2 RNA RESP QL NAA+PROBE: NOT DETECTED
SODIUM SERPL-SCNC: 138 MMOL/L (ref 136–145)
TROPONIN I SERPL-MCNC: <0.01 NG/ML (ref 0–0.04)
WBC # SPEC AUTO: 7.17 X10(3)/MCL (ref 4.5–11.5)

## 2023-06-03 PROCEDURE — 82077 ASSAY SPEC XCP UR&BREATH IA: CPT | Performed by: STUDENT IN AN ORGANIZED HEALTH CARE EDUCATION/TRAINING PROGRAM

## 2023-06-03 PROCEDURE — 99285 EMERGENCY DEPT VISIT HI MDM: CPT | Mod: 25

## 2023-06-03 PROCEDURE — 0240U COVID/FLU A&B PCR: CPT | Performed by: STUDENT IN AN ORGANIZED HEALTH CARE EDUCATION/TRAINING PROGRAM

## 2023-06-03 PROCEDURE — 84484 ASSAY OF TROPONIN QUANT: CPT | Performed by: STUDENT IN AN ORGANIZED HEALTH CARE EDUCATION/TRAINING PROGRAM

## 2023-06-03 PROCEDURE — 83880 ASSAY OF NATRIURETIC PEPTIDE: CPT | Performed by: STUDENT IN AN ORGANIZED HEALTH CARE EDUCATION/TRAINING PROGRAM

## 2023-06-03 PROCEDURE — 85025 COMPLETE CBC W/AUTO DIFF WBC: CPT | Performed by: STUDENT IN AN ORGANIZED HEALTH CARE EDUCATION/TRAINING PROGRAM

## 2023-06-03 PROCEDURE — 93005 ELECTROCARDIOGRAM TRACING: CPT

## 2023-06-03 PROCEDURE — 25000003 PHARM REV CODE 250: Performed by: STUDENT IN AN ORGANIZED HEALTH CARE EDUCATION/TRAINING PROGRAM

## 2023-06-03 PROCEDURE — 80053 COMPREHEN METABOLIC PANEL: CPT | Performed by: STUDENT IN AN ORGANIZED HEALTH CARE EDUCATION/TRAINING PROGRAM

## 2023-06-03 RX ORDER — ONDANSETRON 4 MG/1
4 TABLET, ORALLY DISINTEGRATING ORAL
Status: COMPLETED | OUTPATIENT
Start: 2023-06-03 | End: 2023-06-03

## 2023-06-03 RX ORDER — NALOXONE HYDROCHLORIDE 4 MG/.1ML
SPRAY NASAL
Qty: 1 EACH | Refills: 1 | Status: ON HOLD | OUTPATIENT
Start: 2023-06-03 | End: 2024-04-03 | Stop reason: HOSPADM

## 2023-06-03 RX ORDER — BUPRENORPHINE HYDROCHLORIDE 8 MG/1
8 TABLET SUBLINGUAL
Status: COMPLETED | OUTPATIENT
Start: 2023-06-03 | End: 2023-06-03

## 2023-06-03 RX ADMIN — ONDANSETRON 4 MG: 4 TABLET, ORALLY DISINTEGRATING ORAL at 01:06

## 2023-06-03 RX ADMIN — BUPRENORPHINE 8 MG: 8 TABLET SUBLINGUAL at 04:06

## 2023-06-03 NOTE — ED PROVIDER NOTES
Encounter Date: 6/3/2023    SCRIBE #1 NOTE: I, Chanda Lovett, am scribing for, and in the presence of,  Lacho Ellis MD. I have scribed the following portions of the note - Other sections scribed: HPI, ROS, PE.     History     Chief Complaint   Patient presents with    Drug Overdose     Pt reports she started doing heroin a month ago. Fire Dept gave 8mg narcan due to patient being unresponsive. Pt vomiting in triage     Patient is a 47 year old female with a history of bipolar disorder and depression presents to ED, via EMS, after a drug overdose.  EMS reports that the pt was found unresponsive in the parking lot of a store; 8mg of Narcan was given intranasally.  EMS reports heart rate of 110, BP of 160/100, O2 95% on room air.  Pt says that someone offered her some brown heroin.  Pt says that she does not remember her last heroin use.  Patient denies any SI, HI.  Denies any intentional self-harm.    The history is provided by the patient and the EMS personnel. No  was used.   Drug Overdose  This is a new problem. The current episode started less than 1 hour ago. The problem occurs rarely. The problem has been rapidly improving. Pertinent negatives include no chest pain, no abdominal pain and no shortness of breath. Nothing aggravates the symptoms. The symptoms are relieved by medications. The treatment provided significant relief.   Review of patient's allergies indicates:  No Known Allergies  Past Medical History:   Diagnosis Date    Anemia     Bipolar disorder     Depression     HIV infection     Mitral valve prolapse     Seizures      Past Surgical History:   Procedure Laterality Date    FACIAL FRACTURE SURGERY      TUBAL LIGATION       Family History   Problem Relation Age of Onset    Heart disease Mother     Heart attack Mother     Hypertension Father     COPD Sister     Mental retardation Sister      Social History     Tobacco Use    Smoking status: Every Day     Packs/day: 0.50      Types: Cigarettes    Smokeless tobacco: Never   Substance Use Topics    Alcohol use: Not Currently    Drug use: Not Currently     Review of Systems   Constitutional:  Negative for fever.   HENT:  Negative for sore throat.    Eyes:  Negative for visual disturbance.   Respiratory:  Negative for shortness of breath.    Cardiovascular:  Negative for chest pain.   Gastrointestinal:  Negative for abdominal pain.   Genitourinary:  Negative for dysuria.   Musculoskeletal:  Negative for joint swelling.   Skin:  Negative for rash.   Neurological:  Negative for weakness.   Psychiatric/Behavioral:  Negative for confusion.      Physical Exam     Initial Vitals [06/03/23 1309]   BP Pulse Resp Temp SpO2   (!) 140/100 90 18 97.5 °F (36.4 °C) 96 %      MAP       --         Physical Exam    Nursing note and vitals reviewed.  Constitutional: She appears well-developed and well-nourished.   Tearful and upset about overdosing.   HENT:   Head: Normocephalic and atraumatic.   Eyes: EOM are normal. Pupils are equal, round, and reactive to light.   Neck:   Normal range of motion.  Cardiovascular:  Normal rate, regular rhythm, normal heart sounds and intact distal pulses.           No murmur heard.  Pulmonary/Chest: Breath sounds normal. No respiratory distress. She has no wheezes. She has no rales.   Abdominal: Abdomen is soft. She exhibits no distension. There is no abdominal tenderness. There is no rebound.   Musculoskeletal:         General: No tenderness or edema. Normal range of motion.      Cervical back: Normal range of motion.     Neurological: She is alert. She has normal strength. No cranial nerve deficit. GCS score is 15. GCS eye subscore is 4. GCS verbal subscore is 5. GCS motor subscore is 6.   Skin: Skin is warm and dry. Capillary refill takes less than 2 seconds. No rash noted. No erythema.   Psychiatric: Her mood appears anxious. She expresses no homicidal and no suicidal ideation. She expresses no suicidal plans and no  homicidal plans.       ED Course   Procedures  Labs Reviewed   COMPREHENSIVE METABOLIC PANEL - Abnormal; Notable for the following components:       Result Value    Carbon Dioxide 18 (*)     Glucose Level 167 (*)     Creatinine 1.09 (*)     Globulin 3.6 (*)     Aspartate Aminotransferase 64 (*)     All other components within normal limits   CBC WITH DIFFERENTIAL - Abnormal; Notable for the following components:    RBC 4.17 (*)     MCHC 32.6 (*)     All other components within normal limits   B-TYPE NATRIURETIC PEPTIDE - Normal   TROPONIN I - Normal   COVID/FLU A&B PCR - Normal    Narrative:     The Xpert Xpress SARS-CoV-2/FLU/RSV plus is a rapid, multiplexed real-time PCR test intended for the simultaneous qualitative detection and differentiation of SARS-CoV-2, Influenza A, Influenza B, and respiratory syncytial virus (RSV) viral RNA in either nasopharyngeal swab or nasal swab specimens.         ALCOHOL,MEDICAL (ETHANOL) - Normal   CBC W/ AUTO DIFFERENTIAL    Narrative:     The following orders were created for panel order CBC auto differential.  Procedure                               Abnormality         Status                     ---------                               -----------         ------                     CBC with Differential[[id number]]        Abnormal            Final result                 Please view results for these tests on the individual orders.     EKG Readings: (Independently Interpreted)   Initial Reading: No STEMI. Rhythm: Normal Sinus Rhythm. Heart Rate: 89. Other Impression: normal intervals   Time: 1403   ECG Results              EKG 12-lead (Final result)  Result time 06/03/23 18:24:13      Final result by Interface, [facility] (06/03/23 18:24:13)                   Narrative:    Test Reason : R06.02,    Vent. Rate : 089 BPM     Atrial Rate : 089 BPM     P-R Int : 170 ms          QRS Dur : 096 ms      QT Int : 380 ms       P-R-T Axes : 066 026 029 degrees     QTc Int : 462  ms    Normal sinus rhythm  Normal ECG  No previous ECGs available  Confirmed by Chris Blackwell MD ([id number]) on 6/3/2023 6:24:04 PM    Referred By:             Confirmed By:Chris Blackwell MD                                  Imaging Results              X-Ray Chest AP Portable (Final result)  Result time 06/03/23 14:45:45      Final result by Gerardo Ayala MD (06/03/23 14:45:45)                   Impression:      No acute pulmonary process identified.      Electronically signed by: Gerardo Ayala  Date:    06/03/2023  Time:    14:45               Narrative:    EXAMINATION:  XR CHEST AP PORTABLE    CLINICAL HISTORY:  Shortness of breath    TECHNIQUE:  Frontal view(s) of the chest.    COMPARISON:  Radiography 12/17/2022    FINDINGS:  Normal cardiac silhouette.  The lungs are well-inflated.  No consolidation identified.  No significant pleural effusion or discernible pneumothorax.                                    X-Rays:   Independently Interpreted Readings:   Chest X-Ray: Normal heart size.  No infiltrates.  No acute abnormalities.   Medications   ondansetron disintegrating tablet 4 mg (4 mg Oral Given 6/3/23 1330)   buprenorphine HCL SL tablet 8 mg (8 mg Sublingual Given 6/3/23 1624)     Medical Decision Making:   History:   I obtained history from: someone other than patient and EMS provider.       <> Summary of History: Collateral history from paramedics.  Patient was given 8 mg of intranasal Narcan by fire.  Old Medical Records: I decided to obtain old medical records.  Old Records Summarized: records from clinic visits and records from previous admission(s).       <> Summary of Records: Review previous records, history of HIV, not currently taking antiretrovirals.  History of polysubstance use in the past including opioids previously when go to methadone clinic.  Initial Assessment:   Drug overdose  Differential Diagnosis:   Judging by the patient's chief complaint and pertinent history, the patient has the  following possible differential diagnoses, including but not limited to the following.  Some of these are deemed to be lower likelihood and some more likely based on my physical exam and history combined with possible lab work and/or imaging studies.   Please see the pertinent studies, and refer to the HPI.  Some of these diagnoses will take further evaluation to fully rule out, perhaps as an outpatient and the patient was encouraged to follow up when discharged for more comprehensive evaluation.    ntoxication, toxic ingestion    Clinical Tests:   Lab Tests: Ordered and Reviewed  Radiological Study: Ordered and Reviewed  Medical Tests: Ordered and Reviewed  ED Management:  Patient is a 47-year-old female presents to emergency department for accidental heroin overdose.  See HPI.  See physical exam.  Patient states she was given brown heroin and snorted it.  Afterwards she awoke with fire and paramedics around her and was brought to the emergency department.  Patient denies any SI, HI.  She was tearful and remorseful that she overdosed.  [facility] contacted and has evaluated the patient.  They will follow-up and continued to reach out to the patient.  Patient has prescription for Narcan at the bedside.  A paper prescription was also given to the patient.  Counseled extensively on heroin overdose, risks of failure overdose.  Encouraged need for compliance with both opiate avoidance as well as her other medical problems.  On reassessment again asked if any SI, HI which patient denied.  At this time she does not meet criteria for legal hold.  Answered all questions at this time.  Hemodynamically stable for continued outpatient management strict return precautions.  Patient given list of detox and rehab facilities.  Patient observed in the emergency department for several hours without any recurrence of somnolence or need for additional Narcan.      Medical Decision Making  Problems Addressed:  Accidental  overdose, initial encounter: acute illness or injury that poses a threat to life or bodily functions  Heroin abuse: acute illness or injury that poses a threat to life or bodily functions  Opiate overdose, accidental or unintentional, initial encounter: acute illness or injury that poses a threat to life or bodily functions  SOB (shortness of breath): acute illness or injury that poses a threat to life or bodily functions    Amount and/or Complexity of Data Reviewed  Labs: ordered.  Radiology: ordered and independent interpretation performed.  ECG/medicine tests: ordered and independent interpretation performed.    Risk  Prescription drug management.                Scribe Attestation:   Scribe #1: I performed the above scribed service and the documentation accurately describes the services I performed. I attest to the accuracy of the note.    Attending Attestation:           Physician Attestation for Scribe:  Physician Attestation Statement for Scribe #1: I, Lacho Ellis MD, reviewed documentation, as scribed by Chanda Lovett in my presence, and it is both accurate and complete.                        Clinical Impression:   Final diagnoses:  [R06.02] SOB (shortness of breath)  [T50.901A] Accidental overdose, initial encounter (Primary)  [T40.601A] Opiate overdose, accidental or unintentional, initial encounter  [F11.10] Heroin abuse        ED Disposition Condition    Discharge Stable          ED Prescriptions       Medication Sig Dispense Start Date End Date Auth. Provider    naloxone (NARCAN) 4 mg/actuation Spry 4mg by nasal route as needed for opioid overdose; may repeat every 2-3 minutes in alternating nostrils until medical help arrives. Call 911 1 each 6/3/2023 -- Lacho Ellis MD          Follow-up Information       Follow up With Specialties Details Why Contact Info    Francisca Camejo NP Family Medicine   [address] [facility] [address]      Primary Care  Call in 1 day  Please call [phone] for  a primary care provider.             Lacho Ellis MD  06/04/23 5989

## 2023-06-03 NOTE — DISCHARGE INSTRUCTIONS
Please see list of detox and rehab facilities.      Follow-up with your primary care provider.    You have been evaluated by the [facility].  Please call [phone]    Please fill prescription for Narcan .  Do not use heroin.  Continued opioid use may result in fatal overdose.      Return to the emergency department if any new or worsening symptoms.      Agency:  Contact Information:  Services Provided:  Insurance Accepted:    [facility] [address] [place], [place] [address] [phone] Adults: Detox; inpatient; outpatient; partial inpatient Private Insurance    [facility] [address], 5th [place] [address]  [phone] Adults: Detox; inpatient Private Insurance   [facility] [address] [place], [place] [address] [phone] Adults and Adolescents (13-17): Inpatient; outpatient; transitional living; family therapy Medicare  Medicaid  Private Pay  Sliding Scale (Uninsured)   [facility] [address] [place] [address]  [phone] Adults: Detox; inpatient; pregnant women Medicaid  Private Insurance   [facility] [address] [place] [address]  [phone] Adults: Detox; Detox for pregnant women Medicare    Private Insurance  Medicaid [facility] [address] [place] [address]  [phone] Adults: Detox; inpatient; group therapy Medicare  Medicaid  Private Insurance   [facility] [address] [place], [place] (Adult)     [address], [place] (Adolescent)    [phone] (Weekdays)  [phone] (Weekends) Adults and Adolescents (12-17): Inpatient; outpatient  Family: Pregnant women and women w/ kids up to 12-residental living recovery  Medicaid  Medicare  Private Insurance   [facility] [address] [place], [place] [id number] [phone]. [phone] Adults: Detox; Inpatient Medicaid  Private Insurance    [facility] [address] [place] [address]  [phone] Adults: Outpatient Private Insurance    [facility] [address] [place] [address]  [phone] Adult: Detox; inpatient Medicaid  Medicare    Private Insurance   [facility] [address] [place] [address]  [phone] Adults: Outpatient  Medicaid  Private Insurance  Self-pay   [facility] [address] [place] [address]  [phone] Adults: Detox; Inpatient   Medicaid  Medicare  Private Insurance  VA Benefits   [facility] [address] [facility] [address]  [place] [address]  [phone] All Ages: Substance abuse services and referrals for medical detox. Self pay, Medicaid, Medicare, Private insurance   [facility] [address] [place] [address]  [phone]    [address] [place] [address] [phone] Adults: Outpatient; outpatient for use during pregnancy  No insurance accepted  Private pay only    [facility] [address] [place] [address]  [phone] Adults: Inpatient Medicare  Private Insurance    [facility] [address] [place], [place] [address] [phone] Adults: Detox; inpatient; relapse program; intensive outpatient; family therapy Medicaid  Private Insurance    [address] [id number] [phone] Adults: Detox; inpatient  Medicaid  Private Insurance    [facility] [address] [place], [place] [address] [phone] Adults: Detox; inpatient; treatment for pregnant women Medicaid  [place] [employer] (Uninsured)   [facility] [address] [place] [address]  [phone] Adults: Inpatient; Partial inpatient; outpatient Medicaid  Private Insurance   [facility] [address]  [place] [address]  [phone] Adults: Outpatient Private Insurance     [address] [place] [address]  [phone] Adults: Inpatient; outpatient; family support; aftercare support Private Insurance   [facility]  [address] [facility] [place] [address]  [phone] Adults and Adolescents (12-17): Detox; outpatient Medicaid  Medicare  Private Insurance   [facility] [address] [address]  [phone] Adults: Detox, Inpatient, Outpatient Private Insurance   24 HR Hotline:       [facility]  1-809.409.6696 (HELP) Free 24 HR assistance  N/A   National Helpline for Substance abuse 1-801.833.7688 Free 24 HR assistance N/A   Cocaine Anonymous 1-744.549.2809 Free 24 HR assistance  N/A   Poison Control-Overdose Hotline 1-811.830.9658 Free 24 HR assistance   N/A   Alcohol and Drug Helpline 1-421.422.5489 Free 24 HR assistance  N/A   National [place] of   Problem Gambling Helpline 1-965.434.2172 Free 24 HR assistance N/A

## 2023-08-07 ENCOUNTER — HOSPITAL ENCOUNTER (EMERGENCY)
Facility: HOSPITAL | Age: 48
Discharge: PSYCHIATRIC OR SUBSTANCE USE FACILITY | End: 2023-08-07
Attending: STUDENT IN AN ORGANIZED HEALTH CARE EDUCATION/TRAINING PROGRAM
Payer: MEDICAID

## 2023-08-07 VITALS
SYSTOLIC BLOOD PRESSURE: 123 MMHG | OXYGEN SATURATION: 100 % | RESPIRATION RATE: 18 BRPM | TEMPERATURE: 98 F | DIASTOLIC BLOOD PRESSURE: 87 MMHG | HEART RATE: 100 BPM

## 2023-08-07 DIAGNOSIS — F19.10 POLYSUBSTANCE ABUSE: Primary | ICD-10-CM

## 2023-08-07 LAB
ALBUMIN SERPL-MCNC: 3.7 G/DL (ref 3.5–5)
ALBUMIN/GLOB SERPL: 0.9 RATIO (ref 1.1–2)
ALP SERPL-CCNC: 88 UNIT/L (ref 40–150)
ALT SERPL-CCNC: 17 UNIT/L (ref 0–55)
APAP SERPL-MCNC: <17.4 UG/ML (ref 17.4–30)
AST SERPL-CCNC: 23 UNIT/L (ref 5–34)
BASOPHILS # BLD AUTO: 0.03 X10(3)/MCL
BASOPHILS NFR BLD AUTO: 0.4 %
BILIRUBIN DIRECT+TOT PNL SERPL-MCNC: 0.4 MG/DL
BUN SERPL-MCNC: 7.6 MG/DL (ref 7–18.7)
CALCIUM SERPL-MCNC: 9.6 MG/DL (ref 8.4–10.2)
CHLORIDE SERPL-SCNC: 102 MMOL/L (ref 98–107)
CO2 SERPL-SCNC: 27 MMOL/L (ref 22–29)
CREAT SERPL-MCNC: 0.93 MG/DL (ref 0.55–1.02)
EOSINOPHIL # BLD AUTO: 0.05 X10(3)/MCL (ref 0–0.9)
EOSINOPHIL NFR BLD AUTO: 0.7 %
ERYTHROCYTE [DISTWIDTH] IN BLOOD BY AUTOMATED COUNT: 12.9 % (ref 11.5–17)
ETHANOL SERPL-MCNC: <10 MG/DL
GFR SERPLBLD CREATININE-BSD FMLA CKD-EPI: >60 MLS/MIN/1.73/M2
GLOBULIN SER-MCNC: 4.3 GM/DL (ref 2.4–3.5)
GLUCOSE SERPL-MCNC: 103 MG/DL (ref 74–100)
HCT VFR BLD AUTO: 39.8 % (ref 37–47)
HGB BLD-MCNC: 13.8 G/DL (ref 12–16)
IMM GRANULOCYTES # BLD AUTO: 0.01 X10(3)/MCL (ref 0–0.04)
IMM GRANULOCYTES NFR BLD AUTO: 0.1 %
LYMPHOCYTES # BLD AUTO: 3.79 X10(3)/MCL (ref 0.6–4.6)
LYMPHOCYTES NFR BLD AUTO: 52.7 %
MCH RBC QN AUTO: 29.6 PG (ref 27–31)
MCHC RBC AUTO-ENTMCNC: 34.7 G/DL (ref 33–36)
MCV RBC AUTO: 85.2 FL (ref 80–94)
MONOCYTES # BLD AUTO: 0.42 X10(3)/MCL (ref 0.1–1.3)
MONOCYTES NFR BLD AUTO: 5.8 %
NEUTROPHILS # BLD AUTO: 2.89 X10(3)/MCL (ref 2.1–9.2)
NEUTROPHILS NFR BLD AUTO: 40.3 %
NRBC BLD AUTO-RTO: 0 %
PLATELET # BLD AUTO: 236 X10(3)/MCL (ref 130–400)
PMV BLD AUTO: 10.1 FL (ref 7.4–10.4)
POTASSIUM SERPL-SCNC: 3.6 MMOL/L (ref 3.5–5.1)
PROT SERPL-MCNC: 8 GM/DL (ref 6.4–8.3)
RBC # BLD AUTO: 4.67 X10(6)/MCL (ref 4.2–5.4)
SALICYLATES SERPL-MCNC: <5 MG/DL
SODIUM SERPL-SCNC: 135 MMOL/L (ref 136–145)
TSH SERPL-ACNC: 0.5 UIU/ML (ref 0.35–4.94)
WBC # SPEC AUTO: 7.19 X10(3)/MCL (ref 4.5–11.5)

## 2023-08-07 PROCEDURE — 80143 DRUG ASSAY ACETAMINOPHEN: CPT | Performed by: PHYSICIAN ASSISTANT

## 2023-08-07 PROCEDURE — 99285 EMERGENCY DEPT VISIT HI MDM: CPT

## 2023-08-07 PROCEDURE — 84443 ASSAY THYROID STIM HORMONE: CPT | Performed by: PHYSICIAN ASSISTANT

## 2023-08-07 PROCEDURE — 80179 DRUG ASSAY SALICYLATE: CPT | Performed by: PHYSICIAN ASSISTANT

## 2023-08-07 PROCEDURE — 82077 ASSAY SPEC XCP UR&BREATH IA: CPT | Performed by: PHYSICIAN ASSISTANT

## 2023-08-07 PROCEDURE — 85025 COMPLETE CBC W/AUTO DIFF WBC: CPT | Performed by: PHYSICIAN ASSISTANT

## 2023-08-07 PROCEDURE — 25000003 PHARM REV CODE 250: Performed by: STUDENT IN AN ORGANIZED HEALTH CARE EDUCATION/TRAINING PROGRAM

## 2023-08-07 PROCEDURE — 80053 COMPREHEN METABOLIC PANEL: CPT | Performed by: PHYSICIAN ASSISTANT

## 2023-08-07 RX ORDER — BUPRENORPHINE HYDROCHLORIDE 8 MG/1
8 TABLET SUBLINGUAL
Status: COMPLETED | OUTPATIENT
Start: 2023-08-07 | End: 2023-08-07

## 2023-08-07 RX ADMIN — BUPRENORPHINE 8 MG: 8 TABLET SUBLINGUAL at 07:08

## 2023-08-07 NOTE — FIRST PROVIDER EVALUATION
"Medical screening examination initiated.  I have conducted a focused provider triage encounter, findings are as follows:    Brief history of present illness:  46 yo female presents to ED requesting detox. Patient states she is a "drug addict." Reports last heroin and meth 15 min prior to arrival.      Vitals:    08/07/23 1654   BP: 129/87   Pulse: (!) 128   Resp: 18   Temp: 98.3 °F (36.8 °C)   TempSrc: Oral   SpO2: 95%       Pertinent physical exam:  Patient is awake and alert and oriented.  Ambulatory to triage.  In no acute distress. Tearful in triage.     Brief workup plan:  clearance labs    Preliminary workup initiated; this workup will be continued and followed by the physician or advanced practice provider that is assigned to the patient when roomed.  "

## 2023-08-07 NOTE — ED PROVIDER NOTES
"Encounter Date: 8/7/2023    SCRIBE #1 NOTE: I, El Loraaux, am scribing for, and in the presence of,  Tesfaye Norton IV, MD. I have scribed the following portions of the note - Other sections scribed: HPI, ROS, PE, MDM.       History     Chief Complaint   Patient presents with    Addiction Problem     Reports drug addiction and wants to "get right." Last used heroin and meth 15 minutes PTA. Denies SI/HI     A 48 y/o female with a history of polysubstance abuse, HIV, anemia, bipolar disorder, seizures, and mitral valve prolapse presents to [facility] ED after using methamphetamines and heroin 15 minutes prior to arrival. Patient is requesting to be sent to a rehabilitation center.   Patient denies suicidal ideations, homicidal ideations, and hallucinations.     The history is provided by the patient and medical records. No  was used.     Review of patient's allergies indicates:  No Known Allergies  Past Medical History:   Diagnosis Date    Anemia     Bipolar disorder     Depression     HIV infection     Mitral valve prolapse     Seizures      Past Surgical History:   Procedure Laterality Date    FACIAL FRACTURE SURGERY      TUBAL LIGATION       Family History   Problem Relation Age of Onset    Heart disease Mother     Heart attack Mother     Hypertension Father     COPD Sister     Mental retardation Sister      Social History     Tobacco Use    Smoking status: Every Day     Current packs/day: 0.50     Types: Cigarettes    Smokeless tobacco: Never   Substance Use Topics    Alcohol use: Not Currently    Drug use: Not Currently     Review of Systems   Constitutional:  Negative for chills and fever.   HENT:  Negative for congestion, rhinorrhea and sore throat.    Eyes:  Negative for visual disturbance.   Respiratory:  Negative for cough and shortness of breath.    Cardiovascular:  Negative for chest pain and leg swelling.   Gastrointestinal:  Negative for abdominal pain, nausea and vomiting. "   Genitourinary:  Negative for dysuria, hematuria, vaginal bleeding and vaginal discharge.   Musculoskeletal:  Negative for joint swelling.   Skin:  Negative for rash.   Neurological:  Negative for weakness.   Psychiatric/Behavioral:  Negative for behavioral problems, confusion, hallucinations and suicidal ideas.        Physical Exam     Initial Vitals [08/07/23 1654]   BP Pulse Resp Temp SpO2   129/87 (!) 128 18 98.3 °F (36.8 °C) 95 %      MAP       --         Physical Exam    Nursing note and vitals reviewed.  Constitutional: She is not diaphoretic. No distress.   Cardiovascular:  Normal rate and regular rhythm.           No murmur heard.  Pulmonary/Chest: Breath sounds normal. No respiratory distress. She has no wheezes. She has no rales.   Abdominal: Abdomen is soft. She exhibits no distension. There is no abdominal tenderness.     Neurological: She is alert.   Psychiatric: She has a normal mood and affect.   Patient is calm and cooperative. Patient has a linear thought process. Patient is tearful.          ED Course   Procedures  Labs Reviewed   COMPREHENSIVE METABOLIC PANEL - Abnormal; Notable for the following components:       Result Value    Sodium Level 135 (*)     Glucose Level 103 (*)     Globulin 4.3 (*)     Albumin/Globulin Ratio 0.9 (*)     All other components within normal limits   ACETAMINOPHEN LEVEL - Abnormal; Notable for the following components:    Acetaminophen Level <17.4 (*)     All other components within normal limits   TSH - Normal   ALCOHOL,MEDICAL (ETHANOL) - Normal   CBC W/ AUTO DIFFERENTIAL    Narrative:     The following orders were created for panel order CBC auto differential.  Procedure                               Abnormality         Status                     ---------                               -----------         ------                     CBC with Differential[[id number]]                            Final result                 Please view results for these tests on the  individual orders.   SALICYLATE LEVEL   CBC WITH DIFFERENTIAL   URINALYSIS, REFLEX TO URINE CULTURE   DRUG SCREEN, URINE (BEAKER)          Imaging Results    None          Medications   buprenorphine HCL SL tablet 8 mg (has no administration in time range)           Medical Decision Making  Problems Addressed:  Polysubstance abuse: chronic illness or injury      ED assessment:    [age] with polysubstance abuse presenting with request for rehab she already spoke with [facility]  [facility] has evaluated the patient here in ER has found placement patient is medically cleared [facility] will arrange for transport    ED management:   [facility] representative evaluation       Amount and/or Complexity of Data Reviewed  Independent historian: none  External data reviewed: notes from previous ED visits and prescription medications   Risk and benefits of testing: discussed   Labs: ordered and reviewed  Discussion of management or test interpretation with external provider(s): [facility] representative   Summary of discussion: placement found     Risk  Decision regarding transfer   Diagnosis or treatment significantly impacted by social determinants of health: substance abuse    Critical Care  none    Tesfaye SANTIAGO MD personally performed the history, PE, MDM, and procedures as documented above and agree with the scribe's documentation.        Scribe Attestation:   Scribe #1: I performed the above scribed service and the documentation accurately describes the services I performed. I attest to the accuracy of the note.    Attending Attestation:           Physician Attestation for Scribe:  Physician Attestation Statement for Scribe #1: Tesfaye SANTIAGO IV, MD, reviewed documentation, as scribed by El Stahl in my presence, and it is both accurate and complete.             ED Course as of 08/07/23 1908   Mon Aug 07, 2023   1856 Patient has a bed through [facility] for rehab patient is medically cleared at this time [AC]       ED Course User Index  [AC] Tesfaye Norton IV, MD                 Clinical Impression:   Final diagnoses:  [F19.10] Polysubstance abuse (Primary)        ED Disposition Condition    Transfer to Another Facility Stable                Tesfaye Norton IV, MD  08/07/23 6641

## 2023-08-17 ENCOUNTER — OFFICE VISIT (OUTPATIENT)
Dept: INFECTIOUS DISEASES | Facility: CLINIC | Age: 48
End: 2023-08-17
Payer: MEDICAID

## 2023-08-17 VITALS
HEIGHT: 64 IN | WEIGHT: 174 LBS | DIASTOLIC BLOOD PRESSURE: 86 MMHG | SYSTOLIC BLOOD PRESSURE: 124 MMHG | BODY MASS INDEX: 29.71 KG/M2 | RESPIRATION RATE: 18 BRPM | HEART RATE: 97 BPM | TEMPERATURE: 98 F

## 2023-08-17 DIAGNOSIS — E55.9 VITAMIN D DEFICIENCY: ICD-10-CM

## 2023-08-17 DIAGNOSIS — Z12.11 COLON CANCER SCREENING: ICD-10-CM

## 2023-08-17 DIAGNOSIS — Z11.3 ROUTINE SCREENING FOR STI (SEXUALLY TRANSMITTED INFECTION): ICD-10-CM

## 2023-08-17 DIAGNOSIS — B20 HIV DISEASE: Primary | ICD-10-CM

## 2023-08-17 DIAGNOSIS — Z12.31 ENCOUNTER FOR SCREENING MAMMOGRAM FOR MALIGNANT NEOPLASM OF BREAST: ICD-10-CM

## 2023-08-17 LAB
ALBUMIN SERPL-MCNC: 3.6 G/DL (ref 3.5–5)
ALBUMIN/GLOB SERPL: 0.9 RATIO (ref 1.1–2)
ALP SERPL-CCNC: 67 UNIT/L (ref 40–150)
ALT SERPL-CCNC: 23 UNIT/L (ref 0–55)
APPEARANCE UR: CLEAR
AST SERPL-CCNC: 35 UNIT/L (ref 5–34)
BACTERIA #/AREA URNS AUTO: ABNORMAL /HPF
BASOPHILS # BLD AUTO: 0.02 X10(3)/MCL
BASOPHILS NFR BLD AUTO: 0.3 %
BILIRUB SERPL-MCNC: 0.4 MG/DL
BILIRUB UR QL STRIP.AUTO: NEGATIVE
BUN SERPL-MCNC: 8.4 MG/DL (ref 7–18.7)
C TRACH DNA SPEC QL NAA+PROBE: NOT DETECTED
CALCIUM SERPL-MCNC: 8.9 MG/DL (ref 8.4–10.2)
CHLORIDE SERPL-SCNC: 105 MMOL/L (ref 98–107)
CHOLEST SERPL-MCNC: 155 MG/DL
CHOLEST/HDLC SERPL: 3 {RATIO} (ref 0–5)
CO2 SERPL-SCNC: 24 MMOL/L (ref 22–29)
COLOR UR: ABNORMAL
CREAT SERPL-MCNC: 0.76 MG/DL (ref 0.55–1.02)
DEPRECATED CALCIDIOL+CALCIFEROL SERPL-MC: 17.7 NG/ML (ref 30–80)
EOSINOPHIL # BLD AUTO: 0.03 X10(3)/MCL (ref 0–0.9)
EOSINOPHIL NFR BLD AUTO: 0.5 %
ERYTHROCYTE [DISTWIDTH] IN BLOOD BY AUTOMATED COUNT: 13.2 % (ref 11.5–17)
EST. AVERAGE GLUCOSE BLD GHB EST-MCNC: 99.7 MG/DL
GFR SERPLBLD CREATININE-BSD FMLA CKD-EPI: >60 MLS/MIN/1.73/M2
GLOBULIN SER-MCNC: 3.8 GM/DL (ref 2.4–3.5)
GLUCOSE SERPL-MCNC: 93 MG/DL (ref 74–100)
GLUCOSE UR QL STRIP.AUTO: NORMAL
HBA1C MFR BLD: 5.1 %
HCT VFR BLD AUTO: 39.1 % (ref 37–47)
HCV AB SERPL QL IA: NONREACTIVE
HDLC SERPL-MCNC: 48 MG/DL (ref 35–60)
HGB BLD-MCNC: 12.7 G/DL (ref 12–16)
HYALINE CASTS #/AREA URNS LPF: ABNORMAL /LPF
IMM GRANULOCYTES # BLD AUTO: 0.02 X10(3)/MCL (ref 0–0.04)
IMM GRANULOCYTES NFR BLD AUTO: 0.3 %
KETONES UR QL STRIP.AUTO: NEGATIVE
LDLC SERPL CALC-MCNC: 94 MG/DL (ref 50–140)
LEUKOCYTE ESTERASE UR QL STRIP.AUTO: NEGATIVE
LYMPHOCYTES # BLD AUTO: 2.58 X10(3)/MCL (ref 0.6–4.6)
LYMPHOCYTES NFR BLD AUTO: 39.1 %
MCH RBC QN AUTO: 28.7 PG (ref 27–31)
MCHC RBC AUTO-ENTMCNC: 32.5 G/DL (ref 33–36)
MCV RBC AUTO: 88.3 FL (ref 80–94)
MONOCYTES # BLD AUTO: 0.39 X10(3)/MCL (ref 0.1–1.3)
MONOCYTES NFR BLD AUTO: 5.9 %
MUCOUS THREADS URNS QL MICRO: ABNORMAL /LPF
N GONORRHOEA DNA SPEC QL NAA+PROBE: NOT DETECTED
NEUTROPHILS # BLD AUTO: 3.56 X10(3)/MCL (ref 2.1–9.2)
NEUTROPHILS NFR BLD AUTO: 53.9 %
NITRITE UR QL STRIP.AUTO: NEGATIVE
NRBC BLD AUTO-RTO: 0 %
PH UR STRIP.AUTO: 5.5 [PH]
PLATELET # BLD AUTO: 207 X10(3)/MCL (ref 130–400)
PMV BLD AUTO: 9.2 FL (ref 7.4–10.4)
POTASSIUM SERPL-SCNC: 3.8 MMOL/L (ref 3.5–5.1)
PROT SERPL-MCNC: 7.4 GM/DL (ref 6.4–8.3)
PROT UR QL STRIP.AUTO: NEGATIVE
RBC # BLD AUTO: 4.43 X10(6)/MCL (ref 4.2–5.4)
RBC #/AREA URNS AUTO: ABNORMAL /HPF
RBC UR QL AUTO: NEGATIVE
SODIUM SERPL-SCNC: 139 MMOL/L (ref 136–145)
SOURCE (OHS): NORMAL
SP GR UR STRIP.AUTO: 1.01
SQUAMOUS #/AREA URNS LPF: ABNORMAL /HPF
T PALLIDUM AB SER QL: NONREACTIVE
TRIGL SERPL-MCNC: 65 MG/DL (ref 37–140)
TSH SERPL-ACNC: 1.04 UIU/ML (ref 0.35–4.94)
UROBILINOGEN UR STRIP-ACNC: NORMAL
VLDLC SERPL CALC-MCNC: 13 MG/DL
WBC # SPEC AUTO: 6.6 X10(3)/MCL (ref 4.5–11.5)
WBC #/AREA URNS AUTO: ABNORMAL /HPF

## 2023-08-17 PROCEDURE — 3008F BODY MASS INDEX DOCD: CPT | Mod: CPTII,,, | Performed by: NURSE PRACTITIONER

## 2023-08-17 PROCEDURE — 99215 OFFICE O/P EST HI 40 MIN: CPT | Mod: PBBFAC | Performed by: NURSE PRACTITIONER

## 2023-08-17 PROCEDURE — 1160F PR REVIEW ALL MEDS BY PRESCRIBER/CLIN PHARMACIST DOCUMENTED: ICD-10-PCS | Mod: CPTII,,, | Performed by: NURSE PRACTITIONER

## 2023-08-17 PROCEDURE — 3074F SYST BP LT 130 MM HG: CPT | Mod: CPTII,,, | Performed by: NURSE PRACTITIONER

## 2023-08-17 PROCEDURE — 83036 HEMOGLOBIN GLYCOSYLATED A1C: CPT | Performed by: NURSE PRACTITIONER

## 2023-08-17 PROCEDURE — 86803 HEPATITIS C AB TEST: CPT | Performed by: NURSE PRACTITIONER

## 2023-08-17 PROCEDURE — 0219U NFCT AGT HIV GNRJ SEQ ALYS: CPT | Performed by: NURSE PRACTITIONER

## 2023-08-17 PROCEDURE — 87591 N.GONORRHOEAE DNA AMP PROB: CPT | Performed by: NURSE PRACTITIONER

## 2023-08-17 PROCEDURE — 3044F PR MOST RECENT HEMOGLOBIN A1C LEVEL <7.0%: ICD-10-PCS | Mod: CPTII,,, | Performed by: NURSE PRACTITIONER

## 2023-08-17 PROCEDURE — 82306 VITAMIN D 25 HYDROXY: CPT | Performed by: NURSE PRACTITIONER

## 2023-08-17 PROCEDURE — 1159F MED LIST DOCD IN RCRD: CPT | Mod: CPTII,,, | Performed by: NURSE PRACTITIONER

## 2023-08-17 PROCEDURE — 81001 URINALYSIS AUTO W/SCOPE: CPT | Performed by: NURSE PRACTITIONER

## 2023-08-17 PROCEDURE — 1159F PR MEDICATION LIST DOCUMENTED IN MEDICAL RECORD: ICD-10-PCS | Mod: CPTII,,, | Performed by: NURSE PRACTITIONER

## 2023-08-17 PROCEDURE — 80061 LIPID PANEL: CPT | Performed by: NURSE PRACTITIONER

## 2023-08-17 PROCEDURE — 85025 COMPLETE CBC W/AUTO DIFF WBC: CPT | Performed by: NURSE PRACTITIONER

## 2023-08-17 PROCEDURE — 86361 T CELL ABSOLUTE COUNT: CPT | Performed by: NURSE PRACTITIONER

## 2023-08-17 PROCEDURE — 87536 HIV-1 QUANT&REVRSE TRNSCRPJ: CPT | Performed by: NURSE PRACTITIONER

## 2023-08-17 PROCEDURE — 87491 CHLMYD TRACH DNA AMP PROBE: CPT | Performed by: NURSE PRACTITIONER

## 2023-08-17 PROCEDURE — 99214 OFFICE O/P EST MOD 30 MIN: CPT | Mod: S$PBB,,, | Performed by: NURSE PRACTITIONER

## 2023-08-17 PROCEDURE — 86780 TREPONEMA PALLIDUM: CPT | Performed by: NURSE PRACTITIONER

## 2023-08-17 PROCEDURE — 86480 TB TEST CELL IMMUN MEASURE: CPT | Performed by: NURSE PRACTITIONER

## 2023-08-17 PROCEDURE — 3044F HG A1C LEVEL LT 7.0%: CPT | Mod: CPTII,,, | Performed by: NURSE PRACTITIONER

## 2023-08-17 PROCEDURE — 3079F DIAST BP 80-89 MM HG: CPT | Mod: CPTII,,, | Performed by: NURSE PRACTITIONER

## 2023-08-17 PROCEDURE — 84443 ASSAY THYROID STIM HORMONE: CPT | Performed by: NURSE PRACTITIONER

## 2023-08-17 PROCEDURE — 99214 PR OFFICE/OUTPT VISIT, EST, LEVL IV, 30-39 MIN: ICD-10-PCS | Mod: S$PBB,,, | Performed by: NURSE PRACTITIONER

## 2023-08-17 PROCEDURE — 3074F PR MOST RECENT SYSTOLIC BLOOD PRESSURE < 130 MM HG: ICD-10-PCS | Mod: CPTII,,, | Performed by: NURSE PRACTITIONER

## 2023-08-17 PROCEDURE — 3079F PR MOST RECENT DIASTOLIC BLOOD PRESSURE 80-89 MM HG: ICD-10-PCS | Mod: CPTII,,, | Performed by: NURSE PRACTITIONER

## 2023-08-17 PROCEDURE — 80053 COMPREHEN METABOLIC PANEL: CPT | Performed by: NURSE PRACTITIONER

## 2023-08-17 PROCEDURE — 36415 COLL VENOUS BLD VENIPUNCTURE: CPT | Performed by: NURSE PRACTITIONER

## 2023-08-17 PROCEDURE — 3008F PR BODY MASS INDEX (BMI) DOCUMENTED: ICD-10-PCS | Mod: CPTII,,, | Performed by: NURSE PRACTITIONER

## 2023-08-17 PROCEDURE — 1160F RVW MEDS BY RX/DR IN RCRD: CPT | Mod: CPTII,,, | Performed by: NURSE PRACTITIONER

## 2023-08-17 RX ORDER — ABACAVIR SULFATE, DOLUTEGRAVIR SODIUM, LAMIVUDINE 600; 50; 300 MG/1; MG/1; MG/1
1 TABLET, FILM COATED ORAL DAILY
Qty: 30 TABLET | Refills: 3 | Status: SHIPPED | OUTPATIENT
Start: 2023-08-17 | End: 2024-02-08

## 2023-08-17 RX ORDER — FOLIC ACID 1 MG/1
1000 TABLET ORAL
COMMUNITY
Start: 2023-08-15 | End: 2024-02-08

## 2023-08-17 RX ORDER — DIVALPROEX SODIUM 500 MG/1
500 TABLET, DELAYED RELEASE ORAL 2 TIMES DAILY
COMMUNITY
Start: 2023-08-15 | End: 2024-02-08

## 2023-08-17 RX ORDER — CALCIUM CARBONATE/VITAMIN D3 600 MG-10
100 TABLET ORAL
COMMUNITY
Start: 2023-08-14 | End: 2024-02-08

## 2023-08-17 RX ORDER — BUSPIRONE HYDROCHLORIDE 7.5 MG/1
7.5 TABLET ORAL 2 TIMES DAILY
Status: ON HOLD | COMMUNITY
Start: 2023-08-15 | End: 2024-04-03 | Stop reason: HOSPADM

## 2023-08-17 RX ORDER — MULTIVITAMIN
1 TABLET ORAL
COMMUNITY
Start: 2023-08-14 | End: 2024-02-08

## 2023-08-17 RX ORDER — FLUOXETINE HYDROCHLORIDE 20 MG/1
40 CAPSULE ORAL
COMMUNITY
Start: 2023-08-14 | End: 2024-02-08

## 2023-08-17 RX ORDER — BUPRENORPHINE AND NALOXONE 4; 1 MG/1; MG/1
1 FILM, SOLUBLE BUCCAL; SUBLINGUAL DAILY
COMMUNITY
End: 2024-02-08

## 2023-08-17 RX ORDER — PNV NO.95/FERROUS FUM/FOLIC AC 28MG-0.8MG
100 TABLET ORAL
COMMUNITY
Start: 2023-08-14

## 2023-08-17 NOTE — PROGRESS NOTES
Patient ID: Amy Hanson 47 y.o.     Chief Complaint:   Chief Complaint   Patient presents with    Followup HIV     States out of meds x 4-6 months, states currently in refab        HPI:  8/17/23  Amy is a 46 yo WF presenting today for HIV return visit.  She tells me that she has been off Triumeq for about 6 months now, fell off the wagon with substance abuse and was sent to treatment facility by family.  She has been at [facility] for a couple of weeks now & is doing much better. Ready to resume ART. Last labs 7/22 VL 759441, CD4 556.  She is on menstrual cycle today, but is due for pap smear & will plan for next visit. MMG & Cologuard ordered, agrees to follow through with recommended screenings.  No family history of colon cancer, no rectal bleeding. Feeling hopeful with treatment facility & good overall today.     7/18/22  Amy is a 47 yo HIV + WF presenting today for HIV f/u visit.  She tells me that she has been off Triumeq x 3 months now due to missed appointments.  She states that she was going to the Methadone clinic and was having trouble with transportation getting her here as well.  She is no longer going to Methadone clinic & will be able to attend visits as scheduled.  She has not followed up with Dr. Hooker for cervical pap.  Amenable to having pap smear here, but she is currently on menstrual cycle.  Will plan for pap at next visit in 6 weeks.  Past due for MMG, order placed to schedule at [facility].  She has no family history of colon cancer, no rectal bleeding.  Cologuard ordered for colon cancer screening.  She is not currently taking vitamin d, will check level today.  No new sexual partners, same male partner x 5 years.  She tells me that she has new onset seizure like activity since last visit with me. Will seek PCP evaluation for same ASAP.  All questions answered & concerns addressed.      8/11/21  Amy is a 46 yo HIV + WF evaluated by audio-video telemedicine.   He/She is located at home, and I, the provider, am located at [facility].  We are both located in [place], the [facility] in which I am licensed to practice.  The patient consents to telemedicine visit and is the only individual online.  The patient (or patient's representative) stated that they understood and accepted the privacy and security risks to their information at their location.   She reports 100% adherent to Triumeq, tolerates well with viral suppression. Last labs 4/21 VL <20, Cd4 721.  She agrees to go to [facility] for updated labs today.  She tells me that she is having urinary symptoms of UTI to include frequency, urgency, burning, cloudy & foul smelling urine.  Will order urine with culture & sensitivity today & treat presumptively for simple UTI.  Voiced appreciation.  She remains in care with PCP SURESH Camejo NP.  She has not been able to see Dr. Hooker & states that she would prefer to have pap smear done at my office. Will plan to perform next visit with face to face scheduling.  Will order MMG to be scheduled at [facility] as well. She is taking vitamin d as well.  She has not yet taken COVID vaccination.  Questions answered & concerns addressed.  She tells me that she will go to [facility] today for mRNA 1st dose vaccine.       Past Medical History:   Diagnosis Date    Anemia     Bipolar disorder     Depression     HIV infection     Mitral valve prolapse     Seizures         Past Surgical History:   Procedure Laterality Date    FACIAL FRACTURE SURGERY      TUBAL LIGATION          Social History     Socioeconomic History    Marital status:    Tobacco Use    Smoking status: Every Day     Current packs/day: 0.00     Types: Vaping with nicotine    Smokeless tobacco: Never   Substance and Sexual Activity    Alcohol use: Not Currently    Drug use: Not Currently    Sexual activity: Not Currently     Partners: Male     Birth control/protection: Condom        Family History   Problem Relation Age of Onset  "   Heart disease Mother     Heart attack Mother     Hypertension Father     COPD Sister     Mental retardation Sister         Review of patient's allergies indicates:  No Known Allergies     Immunization History   Administered Date(s) Administered    HPV 9-Valent 02/22/2019, 09/16/2019, 03/06/2020    Hepatitis A / Hepatitis B 08/21/2009    Influenza - Quadrivalent - PF (6-35 months) 03/06/2020    Influenza - Quadrivalent - PF *Preferred* (6 months and older) 10/02/2009, 10/30/2013    Influenza - Trivalent - PF (ADULT) 10/02/2009, 10/30/2013    Meningococcal Conjugate (MCV4P) 02/22/2019, 09/16/2019    Pneumococcal Conjugate - 13 Valent 06/02/2016    Pneumococcal Conjugate - 7 Valent 01/18/2008    Pneumococcal Polysaccharide - 23 Valent 01/18/2008, 03/27/2017    Tdap 06/02/2016        Review of Systems   Constitutional: Negative.    HENT: Negative.     Eyes: Negative.    Respiratory: Negative.     Cardiovascular: Negative.    Gastrointestinal: Negative.    Genitourinary: Negative.    Musculoskeletal: Negative.    Skin: Negative.    Neurological: Negative.    Endo/Heme/Allergies: Negative.    Psychiatric/Behavioral: Negative.     All other systems reviewed and are negative.         Objective:      /86 (BP Location: Right arm, Patient Position: Sitting, BP Method: Large (Automatic))   Pulse 97   Temp 98.2 °F (36.8 °C) (Oral)   Resp 18   Ht 5' 4" (1.626 m)   Wt 78.9 kg (174 lb)   BMI 29.87 kg/m²      Physical Exam  Vitals reviewed.   Constitutional:       General: She is not in acute distress.     Appearance: Normal appearance. She is not toxic-appearing.   HENT:      Mouth/Throat:      Mouth: Mucous membranes are moist.      Pharynx: Oropharynx is clear.   Eyes:      Conjunctiva/sclera: Conjunctivae normal.   Cardiovascular:      Rate and Rhythm: Normal rate and regular rhythm.   Pulmonary:      Effort: Pulmonary effort is normal. No respiratory distress.      Breath sounds: Normal breath sounds. "   Abdominal:      General: Abdomen is flat. Bowel sounds are normal.      Palpations: Abdomen is soft.   Musculoskeletal:         General: Normal range of motion.      Cervical back: Normal range of motion.   Lymphadenopathy:      Cervical: No cervical adenopathy.   Skin:     General: Skin is warm and dry.   Neurological:      General: No focal deficit present.      Mental Status: She is alert and oriented to person, place, and time. Mental status is at baseline.   Psychiatric:         Mood and Affect: Mood normal.         Behavior: Behavior normal.          Labs: Reviewed most recent relevant labs available, notable results highlighted in this note    Imaging: Reviewed most recent relevant imaging studies available, notable results highlighted in this note      Medications:     Current Outpatient Medications   Medication Instructions    albuterol (PROVENTIL HFA) 90 mcg/actuation inhaler 2 puffs, Inhalation, Every 6 hours PRN, Rescue    buprenorphine-naloxone 4-1 mg (SUBOXONE) 4-1 mg Film 1 Film, Sublingual, Daily    busPIRone (BUSPAR) 7.5 mg, Oral, 2 times daily    cyanocobalamin (VITAMIN B-12) 100 mcg, Oral    divalproex (DEPAKOTE) 500 mg, Oral, 2 times daily    divalproex 500 mg, Oral, 3 times daily    FLUoxetine 40 mg, Oral    FLUoxetine 40 mg, Oral    folic acid (FOLVITE) 1,000 mcg, Oral    multivitamin (THERAGRAN) per tablet 1 tablet, Oral    naloxone (NARCAN) 4 mg/actuation Spry 4mg by nasal route as needed for opioid overdose; may repeat every 2-3 minutes in alternating nostrils until medical help arrives. Call 911    QUEtiapine (SEROQUEL) 600 mg, Oral, Nightly    TRIUMEQ 600- mg Tab 1 tablet, Oral, Daily    VITAMIN B-1 (MONONITRATE) 100 mg, Oral       Assessment:       Problem List Items Addressed This Visit    None  Visit Diagnoses       HIV disease    -  Primary    Relevant Medications    TRIUMEQ 600- mg Tab    Other Relevant Orders    Quantiferon Gold TB    Hemoglobin A1C (Completed)    TSH  (Completed)    Lipid Panel (Completed)    Urinalysis (Completed)    Comprehensive Metabolic Panel (Completed)    CBC Auto Differential (Completed)    CD4 Lymphocytes    HIV-1 RNA, Quantitative, PCR with Reflex to Genotype    Colon cancer screening        Relevant Orders    Cologuard Screening (Multitarget Stool DNA)    Encounter for screening mammogram for malignant neoplasm of breast        Relevant Orders    Mammo Digital Screening Bilat    Routine screening for STI (sexually transmitted infection)        Relevant Orders    Hepatitis C Antibody (Completed)    SYPHILIS ANTIBODY (WITH REFLEX RPR) (Completed)    Chlamydia/GC, PCR (Completed)    C.trach/N.gonor AMP RNA    Vitamin D deficiency        Relevant Orders    Vitamin D (Completed)               Plan:      HIV disease  -     TRIUMEQ 600- mg Tab; Take 1 tablet by mouth once daily.  Dispense: 30 tablet; Refill: 3  -     Quantiferon Gold TB; Future; Expected date: 08/17/2023  -     Hemoglobin A1C; Future; Expected date: 08/17/2023  -     TSH; Future; Expected date: 08/17/2023  -     Lipid Panel; Future; Expected date: 08/17/2023  -     Urinalysis  -     Comprehensive Metabolic Panel  -     CBC Auto Differential; Future; Expected date: 08/17/2023  -     CD4 Lymphocytes; Future; Expected date: 08/17/2023  -     HIV-1 RNA, Quantitative, PCR with Reflex to Genotype; Future; Expected date: 08/17/2023     Diagnosed 7/24/2006.  Adherence and sexual health counseling done.  Use condoms for all sexual encounters.  Blood precautions.  Resume Triumeq 1 po daily as prescribed.  Labs as ordered.  RTC 6 weeks with Amanda.   Pap smears next visit.      Wellness:  Cervical pap: ~2019 Dr. Hooker  Anal pap: 9/2019 NIL  Cervical CT/GC: 4/2021 Neg, 7/22 Neg, 8/23  Anal CT/GC: 9/2019 Neg  Oral CT/GC: 9/2019 Neg, 8/23  Eye exam: 9/2019  MMG: ~2019 [facility]  DEXA: N/A  Colon cancer screen: Cologuard ordered.    Colon cancer screening  -     Cologuard Screening  (Multitarget Stool DNA); Future; Expected date: 08/17/2023  Cologuard ordered.     Encounter for screening mammogram for malignant neoplasm of breast  -     Mammo Digital Screening Bilat; Future; Expected date: 09/17/2023  MMG next available.     Routine screening for STI (sexually transmitted infection)  -     Hepatitis C Antibody; Future; Expected date: 08/17/2023  -     SYPHILIS ANTIBODY (WITH REFLEX RPR); Future; Expected date: 08/17/2023  -     Chlamydia/GC, PCR  -     C.trach/N.gonor AMP RNA; Future; Expected date: 08/17/2023  STI screenings as ordered.     Vitamin D deficiency  -     Vitamin D; Future; Expected date: 08/17/2023  Check level today.

## 2023-08-19 LAB
C TRACH RRNA SPEC QL NAA+PROBE: NEGATIVE
HIV1 RNA # PLAS NAA DL=20: ABNORMAL COPIES/ML
N GONORRHOEA RRNA SPEC QL NAA+PROBE: NEGATIVE
SPECIMEN SOURCE: NORMAL
SPECIMEN SOURCE: NORMAL

## 2023-08-20 LAB
GAMMA INTERFERON BACKGROUND BLD IA-ACNC: 0.07 IU/ML
M TB IFN-G BLD-IMP: NEGATIVE
M TB IFN-G CD4+ BCKGRND COR BLD-ACNC: 0.04 IU/ML
M TB IFN-G CD4+CD8+ BCKGRND COR BLD-ACNC: 0.02 IU/ML
MITOGEN IGNF BCKGRD COR BLD-ACNC: 9.93 IU/ML

## 2023-08-22 LAB
AGE: 48
CD3+CD4+ CELLS # SPEC: 347.49 UNIT/L (ref 589–1505)
CD3+CD4+ CELLS NFR BLD: 13.5 %
LYMPHOCYTES # BLD AUTO: 2574 X10(3)/MCL (ref 1260–5520)
LYMPHOCYTES NFR LN MANUAL: 39 % (ref 28–48)
LYMPHOMA - T-CELL MARKERS SPEC-IMP: ABNORMAL
WBC # BLD AUTO: 6600 /MM3 (ref 4500–11500)

## 2023-10-19 LAB — NONINV COLON CA DNA+OCC BLD SCRN STL QL: NORMAL

## 2023-12-23 ENCOUNTER — HOSPITAL ENCOUNTER (EMERGENCY)
Facility: HOSPITAL | Age: 48
Discharge: HOME | End: 2023-12-23
Attending: EMERGENCY MEDICINE
Payer: MEDICAID

## 2023-12-23 VITALS
DIASTOLIC BLOOD PRESSURE: 74 MMHG | HEART RATE: 80 BPM | BODY MASS INDEX: 30.05 KG/M2 | OXYGEN SATURATION: 100 % | TEMPERATURE: 98 F | RESPIRATION RATE: 18 BRPM | HEIGHT: 64 IN | SYSTOLIC BLOOD PRESSURE: 119 MMHG | WEIGHT: 176 LBS

## 2023-12-23 DIAGNOSIS — R55 VASOVAGAL SYNCOPE: Primary | ICD-10-CM

## 2023-12-23 DIAGNOSIS — R55 SYNCOPE: ICD-10-CM

## 2023-12-23 LAB
ALBUMIN SERPL-MCNC: 4 G/DL (ref 3.5–5)
ALBUMIN/GLOB SERPL: 1 RATIO (ref 1.1–2)
ALP SERPL-CCNC: 78 UNIT/L (ref 40–150)
ALT SERPL-CCNC: 11 UNIT/L (ref 0–55)
AMPHET UR QL SCN: NEGATIVE
APPEARANCE UR: CLEAR
AST SERPL-CCNC: 16 UNIT/L (ref 5–34)
B-HCG SERPL QL: NEGATIVE
BARBITURATE SCN PRESENT UR: NEGATIVE
BASOPHILS # BLD AUTO: 0.02 X10(3)/MCL
BASOPHILS NFR BLD AUTO: 0.3 %
BENZODIAZ UR QL SCN: NEGATIVE
BILIRUB SERPL-MCNC: 0.2 MG/DL
BILIRUB UR QL STRIP.AUTO: ABNORMAL
BUN SERPL-MCNC: 13 MG/DL (ref 7–18.7)
CALCIUM SERPL-MCNC: 9.4 MG/DL (ref 8.4–10.2)
CANNABINOIDS UR QL SCN: POSITIVE
CHLORIDE SERPL-SCNC: 101 MMOL/L (ref 98–107)
CO2 SERPL-SCNC: 24 MMOL/L (ref 22–29)
COCAINE UR QL SCN: NEGATIVE
COLOR UR AUTO: YELLOW
CREAT SERPL-MCNC: 1.01 MG/DL (ref 0.55–1.02)
EOSINOPHIL # BLD AUTO: 0.01 X10(3)/MCL (ref 0–0.9)
EOSINOPHIL NFR BLD AUTO: 0.2 %
ERYTHROCYTE [DISTWIDTH] IN BLOOD BY AUTOMATED COUNT: 12.3 % (ref 11.5–17)
ETHANOL SERPL-MCNC: 10 MG/DL
FENTANYL UR QL SCN: NEGATIVE
GFR SERPLBLD CREATININE-BSD FMLA CKD-EPI: >60 MLS/MIN/1.73/M2
GLOBULIN SER-MCNC: 4.2 GM/DL (ref 2.4–3.5)
GLUCOSE SERPL-MCNC: 89 MG/DL (ref 74–100)
GLUCOSE UR QL STRIP.AUTO: NEGATIVE
HCT VFR BLD AUTO: 41.2 % (ref 37–47)
HGB BLD-MCNC: 13.5 G/DL (ref 12–16)
IMM GRANULOCYTES # BLD AUTO: 0.02 X10(3)/MCL (ref 0–0.04)
IMM GRANULOCYTES NFR BLD AUTO: 0.3 %
KETONES UR QL STRIP.AUTO: NEGATIVE
LEUKOCYTE ESTERASE UR QL STRIP.AUTO: NEGATIVE
LYMPHOCYTES # BLD AUTO: 2.46 X10(3)/MCL (ref 0.6–4.6)
LYMPHOCYTES NFR BLD AUTO: 37.2 %
MCH RBC QN AUTO: 30.7 PG (ref 27–31)
MCHC RBC AUTO-ENTMCNC: 32.8 G/DL (ref 33–36)
MCV RBC AUTO: 93.6 FL (ref 80–94)
MDMA UR QL SCN: NEGATIVE
MONOCYTES # BLD AUTO: 0.38 X10(3)/MCL (ref 0.1–1.3)
MONOCYTES NFR BLD AUTO: 5.7 %
NEUTROPHILS # BLD AUTO: 3.72 X10(3)/MCL (ref 2.1–9.2)
NEUTROPHILS NFR BLD AUTO: 56.3 %
NITRITE UR QL STRIP.AUTO: NEGATIVE
OPIATES UR QL SCN: NEGATIVE
PCP UR QL: NEGATIVE
PH UR STRIP.AUTO: 5.5 [PH]
PH UR: 5.5 [PH] (ref 3–11)
PLATELET # BLD AUTO: 209 X10(3)/MCL (ref 130–400)
PMV BLD AUTO: 9.9 FL (ref 7.4–10.4)
POTASSIUM SERPL-SCNC: 4 MMOL/L (ref 3.5–5.1)
PROT SERPL-MCNC: 8.2 GM/DL (ref 6.4–8.3)
PROT UR QL STRIP.AUTO: NEGATIVE
RBC # BLD AUTO: 4.4 X10(6)/MCL (ref 4.2–5.4)
RBC UR QL AUTO: NEGATIVE
SODIUM SERPL-SCNC: 138 MMOL/L (ref 136–145)
SP GR UR STRIP.AUTO: >=1.03 (ref 1–1.03)
SPECIFIC GRAVITY, URINE AUTO (.000) (OHS): >=1.03 (ref 1–1.03)
TROPONIN I SERPL-MCNC: <0.01 NG/ML (ref 0–0.04)
UROBILINOGEN UR STRIP-ACNC: 0.2
WBC # SPEC AUTO: 6.61 X10(3)/MCL (ref 4.5–11.5)

## 2023-12-23 PROCEDURE — 25000003 PHARM REV CODE 250: Performed by: EMERGENCY MEDICINE

## 2023-12-23 PROCEDURE — 93010 EKG 12-LEAD: ICD-10-PCS | Mod: ,,, | Performed by: INTERNAL MEDICINE

## 2023-12-23 PROCEDURE — 85025 COMPLETE CBC W/AUTO DIFF WBC: CPT | Performed by: EMERGENCY MEDICINE

## 2023-12-23 PROCEDURE — 81025 URINE PREGNANCY TEST: CPT | Performed by: EMERGENCY MEDICINE

## 2023-12-23 PROCEDURE — 80053 COMPREHEN METABOLIC PANEL: CPT | Performed by: EMERGENCY MEDICINE

## 2023-12-23 PROCEDURE — 82077 ASSAY SPEC XCP UR&BREATH IA: CPT | Performed by: EMERGENCY MEDICINE

## 2023-12-23 PROCEDURE — 81003 URINALYSIS AUTO W/O SCOPE: CPT | Mod: 59 | Performed by: EMERGENCY MEDICINE

## 2023-12-23 PROCEDURE — 99285 EMERGENCY DEPT VISIT HI MDM: CPT | Mod: 25

## 2023-12-23 PROCEDURE — 80307 DRUG TEST PRSMV CHEM ANLYZR: CPT | Performed by: EMERGENCY MEDICINE

## 2023-12-23 PROCEDURE — 96360 HYDRATION IV INFUSION INIT: CPT

## 2023-12-23 PROCEDURE — 84484 ASSAY OF TROPONIN QUANT: CPT | Performed by: EMERGENCY MEDICINE

## 2023-12-23 PROCEDURE — 93005 ELECTROCARDIOGRAM TRACING: CPT

## 2023-12-23 PROCEDURE — 93010 ELECTROCARDIOGRAM REPORT: CPT | Mod: ,,, | Performed by: INTERNAL MEDICINE

## 2023-12-23 RX ADMIN — SODIUM CHLORIDE 1000 ML: 9 INJECTION, SOLUTION INTRAVENOUS at 10:12

## 2023-12-23 NOTE — ED PROVIDER NOTES
Encounter Date: 12/23/2023       History     Chief Complaint   Patient presents with    Loss of Consciousness     Pt passed out while sitting in dining room chair, came to and vomited. Pt feeling weak at this time.      The history is provided by the patient and the EMS personnel.   Loss of Consciousness  This is a new problem. The current episode started less than 1 hour ago. The problem has been rapidly improving. Pertinent negatives include no chest pain and no shortness of breath. Nothing aggravates the symptoms. Nothing relieves the symptoms.   Began to feel lightheaded and weak at home and became syncopal.  + LOC.  After coming to, she had one episode of N/V and states she then began to feel better.  No complaints at present - feels back to baseline.  No recent illness.  Recently had Prozac dose increase.    Review of patient's allergies indicates:  No Known Allergies  Past Medical History:   Diagnosis Date    Anemia     Bipolar disorder     Depression     HIV infection     Mitral valve prolapse     Seizures      Past Surgical History:   Procedure Laterality Date    FACIAL FRACTURE SURGERY      TUBAL LIGATION       Family History   Problem Relation Age of Onset    Heart disease Mother     Heart attack Mother     Hypertension Father     COPD Sister     Intellectual disability Sister      Social History     Tobacco Use    Smoking status: Every Day     Types: Vaping with nicotine    Smokeless tobacco: Never   Substance Use Topics    Alcohol use: Not Currently    Drug use: Not Currently     Review of Systems   Constitutional:  Negative for fever.   HENT:  Negative for sore throat.    Respiratory:  Negative for shortness of breath.    Cardiovascular:  Positive for syncope. Negative for chest pain.   Gastrointestinal:  Negative for nausea.   Genitourinary:  Negative for dysuria.   Musculoskeletal:  Negative for back pain.   Skin:  Negative for rash.   Neurological:  Negative for weakness.   Hematological:  Does not  bruise/bleed easily.       Physical Exam     Initial Vitals [12/23/23 0946]   BP Pulse Resp Temp SpO2   115/71 97 18 98.4 °F (36.9 °C) 96 %      MAP       --         Physical Exam    Nursing note and vitals reviewed.  Constitutional: She appears well-developed and well-nourished.   HENT:   Head: Normocephalic and atraumatic.   Right Ear: External ear normal.   Left Ear: External ear normal.   Eyes: Conjunctivae and EOM are normal. Pupils are equal, round, and reactive to light.   Neck: Neck supple.   Normal range of motion.  Cardiovascular:  Normal rate, regular rhythm, normal heart sounds and intact distal pulses.           Pulmonary/Chest: Breath sounds normal.   Abdominal: Abdomen is soft. Bowel sounds are normal.   Musculoskeletal:         General: Normal range of motion.      Cervical back: Normal range of motion and neck supple.     Neurological: She is alert and oriented to person, place, and time. GCS score is 15. GCS eye subscore is 4. GCS verbal subscore is 5. GCS motor subscore is 6.   Skin: Skin is warm and dry. Capillary refill takes less than 2 seconds.   Psychiatric: She has a normal mood and affect. Her behavior is normal. Judgment and thought content normal.         ED Course   Procedures  Labs Reviewed   COMPREHENSIVE METABOLIC PANEL - Abnormal; Notable for the following components:       Result Value    Globulin 4.2 (*)     Albumin/Globulin Ratio 1.0 (*)     All other components within normal limits   URINALYSIS, REFLEX TO URINE CULTURE - Abnormal; Notable for the following components:    Bilirubin, UA 1+ (*)     All other components within normal limits   DRUG SCREEN, URINE (BEAKER) - Abnormal; Notable for the following components:    Cannabinoids, Urine Positive (*)     All other components within normal limits    Narrative:     Cut off concentrations:    Amphetamines - 1000 ng/ml  Barbiturates - 200 ng/ml  Benzodiazepine - 200 ng/ml  Cannabinoids (THC) - 50 ng/ml  Cocaine - 300 ng/ml  Fentanyl  - 1.0 ng/ml  MDMA - 500 ng/ml  Opiates - 300 ng/ml   Phencyclidine (PCP) - 25 ng/ml    Specimen submitted for drug analysis and tested for pH and specific gravity in order to evaluate sample integrity. Suspect tampering if specific gravity is <1.003 and/or pH is not within the range of 4.5 - 8.0  False negatives may result form substances such as bleach added to urine.  False positives may result for the presence of a substance with similar chemical structure to the drug or its metabolite.    This test provides only a PRELIMINARY analytical test result. A more specific alternate chemical method must be used in order to obtain a confirmed analytical result. Gas chromatography/mass spectrometry (GC/MS) is the preferred confirmatory method. Other chemical confirmation methods are available. Clinical consideration and professional judgement should be applied to any drug of abuse test result, particularly when preliminary positive results are used.    Positive results will be confirmed only at the physicians request. Unconfirmed screening results are to be used only for medical purposes (treatment).        CBC WITH DIFFERENTIAL - Abnormal; Notable for the following components:    MCHC 32.8 (*)     All other components within normal limits   PREGNANCY TEST, URINE RAPID - Normal   TROPONIN I - Normal   ALCOHOL,MEDICAL (ETHANOL) - Normal   CBC W/ AUTO DIFFERENTIAL    Narrative:     The following orders were created for panel order CBC auto differential.  Procedure                               Abnormality         Status                     ---------                               -----------         ------                     CBC with Differential[[id number]]       Abnormal            Final result                 Please view results for these tests on the individual orders.     EKG Readings: (Independently Interpreted)   Initial Reading: No STEMI. Rhythm: Normal Sinus Rhythm. Heart Rate: 89. Ectopy: No Ectopy. Conduction:  Normal. ST Segments: Normal ST Segments. T Waves: Normal. Axis: Normal. Clinical Impression: Normal Sinus Rhythm     ECG Results              EKG 12-lead (In process)  Result time 12/23/23 10:47:29      In process by Interface, Lab In [facility] (12/23/23 10:47:29)                   Narrative:    Test Reason : R55,    Vent. Rate : 089 BPM     Atrial Rate : 089 BPM     P-R Int : 150 ms          QRS Dur : 076 ms      QT Int : 360 ms       P-R-T Axes : 045 024 027 degrees     QTc Int : 438 ms    Normal sinus rhythm  Normal ECG  When compared with ECG of 03-JUN-2023 14:03,  No significant change was found    Referred By: [email or web address]   SELF           Confirmed By:                                   Imaging Results              CT Head Without Contrast (Final result)  Result time 12/23/23 10:59:36      Final result by Harvinder Perez MD (12/23/23 10:59:36)                   Impression:      No acute intracranial abnormality    Small amount of fluid in the left sphenoid sinus could reflect acute inflammatory sinusitis.      Electronically signed by: Harvinder Perez MD  Date:    12/23/2023  Time:    10:59               Narrative:    EXAMINATION:  CT of the head without contrast    [id number]    CLINICAL HISTORY:  Syncopal episode para    TECHNIQUE:  Routine CT of the head was performed without contrast    Total DLP: 981 mGy.cm    Automatic exposure control was utilized to reduce the patient's dose    COMPARISON:  None    FINDINGS:  There is no acute intracranial hemorrhage, midline shift, mass-effect, or extra-axial collection.  No sulcal effacement.  Gray-white matter differentiation is preserved.  Basal cisterns are patent.    Chronic bilateral nasal bone fractures noted.  Patient is status post plate and screw fixation no of the anterior bilateral maxillary sinuses.  There is small amount of fluid in the left sphenoid sinus.                                       Medications   sodium chloride 0.9% bolus 1,000 mL 1,000 mL (0 mLs  Intravenous Stopped 12/23/23 1126)     Medical Decision Making  Amount and/or Complexity of Data Reviewed  Labs: ordered. Decision-making details documented in ED Course.  Radiology: ordered. Decision-making details documented in ED Course.  ECG/medicine tests: ordered and independent interpretation performed. Decision-making details documented in ED Course.    Differential includes:  vasovagal, medication side effect, orthostatic hypotension, dysrhythmia, anemia, CVA, seizure.  Will obtain CBC, CMP EtOH, UA, UPT, UDS, EKG, head CT.  If all unremarkable, D/C home with outpatient F/U.                                  Clinical Impression:  Final diagnoses:  [R55] Syncope  [R55] Vasovagal syncope (Primary)          ED Disposition Condition    Discharge Stable          ED Prescriptions    None       Follow-up Information       Follow up With Specialties Details Why Contact Info    Follow up with your primary MD in 3-5 days if not improved.  Return to ED for worsening symptoms.                 Camilo Yu MD  12/23/23 8122

## 2024-02-08 ENCOUNTER — TELEPHONE (OUTPATIENT)
Dept: INFECTIOUS DISEASES | Facility: CLINIC | Age: 49
End: 2024-02-08

## 2024-02-08 ENCOUNTER — OFFICE VISIT (OUTPATIENT)
Dept: INFECTIOUS DISEASES | Facility: CLINIC | Age: 49
End: 2024-02-08
Payer: MEDICAID

## 2024-02-08 ENCOUNTER — LAB VISIT (OUTPATIENT)
Dept: LAB | Facility: HOSPITAL | Age: 49
End: 2024-02-08
Attending: NURSE PRACTITIONER
Payer: MEDICAID

## 2024-02-08 VITALS
HEART RATE: 88 BPM | TEMPERATURE: 98 F | HEIGHT: 64 IN | RESPIRATION RATE: 16 BRPM | WEIGHT: 185.81 LBS | DIASTOLIC BLOOD PRESSURE: 75 MMHG | BODY MASS INDEX: 31.72 KG/M2 | SYSTOLIC BLOOD PRESSURE: 106 MMHG

## 2024-02-08 DIAGNOSIS — R22.33 SUBCUTANEOUS MASS OF BOTH UPPER EXTREMITIES: ICD-10-CM

## 2024-02-08 DIAGNOSIS — Z12.4 CERVICAL CANCER SCREENING: ICD-10-CM

## 2024-02-08 DIAGNOSIS — R35.0 URINARY FREQUENCY: ICD-10-CM

## 2024-02-08 DIAGNOSIS — E87.5 HYPERKALEMIA: Primary | ICD-10-CM

## 2024-02-08 DIAGNOSIS — E55.9 VITAMIN D DEFICIENCY: ICD-10-CM

## 2024-02-08 DIAGNOSIS — Z12.31 ENCOUNTER FOR SCREENING MAMMOGRAM FOR MALIGNANT NEOPLASM OF BREAST: ICD-10-CM

## 2024-02-08 DIAGNOSIS — N30.00 ACUTE CYSTITIS WITHOUT HEMATURIA: Primary | ICD-10-CM

## 2024-02-08 DIAGNOSIS — B20 HIV DISEASE: ICD-10-CM

## 2024-02-08 DIAGNOSIS — B20 HIV DISEASE: Primary | ICD-10-CM

## 2024-02-08 LAB
ALBUMIN SERPL-MCNC: 3.8 G/DL (ref 3.5–5)
ALBUMIN/GLOB SERPL: 0.9 RATIO (ref 1.1–2)
ALP SERPL-CCNC: 91 UNIT/L (ref 40–150)
ALT SERPL-CCNC: 13 UNIT/L (ref 0–55)
APPEARANCE UR: ABNORMAL
AST SERPL-CCNC: 19 UNIT/L (ref 5–34)
BACTERIA #/AREA URNS AUTO: ABNORMAL /HPF
BASOPHILS # BLD AUTO: 0.02 X10(3)/MCL
BASOPHILS NFR BLD AUTO: 0.3 %
BILIRUB SERPL-MCNC: 0.2 MG/DL
BILIRUB UR QL STRIP.AUTO: NEGATIVE
BUN SERPL-MCNC: 12.7 MG/DL (ref 7–18.7)
CALCIUM SERPL-MCNC: 9.1 MG/DL (ref 8.4–10.2)
CHLORIDE SERPL-SCNC: 106 MMOL/L (ref 98–107)
CO2 SERPL-SCNC: 28 MMOL/L (ref 22–29)
COLOR UR AUTO: ABNORMAL
CREAT SERPL-MCNC: 0.86 MG/DL (ref 0.55–1.02)
EOSINOPHIL # BLD AUTO: 0.04 X10(3)/MCL (ref 0–0.9)
EOSINOPHIL NFR BLD AUTO: 0.7 %
ERYTHROCYTE [DISTWIDTH] IN BLOOD BY AUTOMATED COUNT: 12.1 % (ref 11.5–17)
GFR SERPLBLD CREATININE-BSD FMLA CKD-EPI: >60 MLS/MIN/1.73/M2
GLOBULIN SER-MCNC: 4.1 GM/DL (ref 2.4–3.5)
GLUCOSE SERPL-MCNC: 79 MG/DL (ref 74–100)
GLUCOSE UR QL STRIP.AUTO: NORMAL
HCT VFR BLD AUTO: 38.9 % (ref 37–47)
HGB BLD-MCNC: 12.5 G/DL (ref 12–16)
HYALINE CASTS #/AREA URNS LPF: ABNORMAL /LPF
IMM GRANULOCYTES # BLD AUTO: 0 X10(3)/MCL (ref 0–0.04)
IMM GRANULOCYTES NFR BLD AUTO: 0 %
KETONES UR QL STRIP.AUTO: NEGATIVE
LEUKOCYTE ESTERASE UR QL STRIP.AUTO: 500
LYMPHOCYTES # BLD AUTO: 3.69 X10(3)/MCL (ref 0.6–4.6)
LYMPHOCYTES NFR BLD AUTO: 63 %
MCH RBC QN AUTO: 30 PG (ref 27–31)
MCHC RBC AUTO-ENTMCNC: 32.1 G/DL (ref 33–36)
MCV RBC AUTO: 93.3 FL (ref 80–94)
MONOCYTES # BLD AUTO: 0.37 X10(3)/MCL (ref 0.1–1.3)
MONOCYTES NFR BLD AUTO: 6.3 %
NEUTROPHILS # BLD AUTO: 1.74 X10(3)/MCL (ref 2.1–9.2)
NEUTROPHILS NFR BLD AUTO: 29.7 %
NITRITE UR QL STRIP.AUTO: ABNORMAL
NRBC BLD AUTO-RTO: 0 %
PH UR STRIP.AUTO: 5.5 [PH]
PLATELET # BLD AUTO: 181 X10(3)/MCL (ref 130–400)
PMV BLD AUTO: 9.7 FL (ref 7.4–10.4)
POTASSIUM SERPL-SCNC: 5.6 MMOL/L (ref 3.5–5.1)
PROT SERPL-MCNC: 7.9 GM/DL (ref 6.4–8.3)
PROT UR QL STRIP.AUTO: NEGATIVE
RBC # BLD AUTO: 4.17 X10(6)/MCL (ref 4.2–5.4)
RBC #/AREA URNS AUTO: ABNORMAL /HPF
RBC UR QL AUTO: NEGATIVE
SODIUM SERPL-SCNC: 141 MMOL/L (ref 136–145)
SP GR UR STRIP.AUTO: 1.01 (ref 1–1.03)
SQUAMOUS #/AREA URNS LPF: ABNORMAL /HPF
UROBILINOGEN UR STRIP-ACNC: NORMAL
WBC # SPEC AUTO: 5.86 X10(3)/MCL (ref 4.5–11.5)
WBC #/AREA URNS AUTO: ABNORMAL /HPF

## 2024-02-08 PROCEDURE — 87086 URINE CULTURE/COLONY COUNT: CPT | Performed by: NURSE PRACTITIONER

## 2024-02-08 PROCEDURE — 86359 T CELLS TOTAL COUNT: CPT | Performed by: NURSE PRACTITIONER

## 2024-02-08 PROCEDURE — 86359 T CELLS TOTAL COUNT: CPT

## 2024-02-08 PROCEDURE — 99215 OFFICE O/P EST HI 40 MIN: CPT | Mod: S$PBB,,, | Performed by: NURSE PRACTITIONER

## 2024-02-08 PROCEDURE — 3074F SYST BP LT 130 MM HG: CPT | Mod: CPTII,,, | Performed by: NURSE PRACTITIONER

## 2024-02-08 PROCEDURE — 1160F RVW MEDS BY RX/DR IN RCRD: CPT | Mod: CPTII,,, | Performed by: NURSE PRACTITIONER

## 2024-02-08 PROCEDURE — 36415 COLL VENOUS BLD VENIPUNCTURE: CPT

## 2024-02-08 PROCEDURE — 80053 COMPREHEN METABOLIC PANEL: CPT

## 2024-02-08 PROCEDURE — 81001 URINALYSIS AUTO W/SCOPE: CPT | Performed by: NURSE PRACTITIONER

## 2024-02-08 PROCEDURE — 85025 COMPLETE CBC W/AUTO DIFF WBC: CPT

## 2024-02-08 PROCEDURE — 99459 PELVIC EXAMINATION: CPT | Mod: PBBFAC | Performed by: NURSE PRACTITIONER

## 2024-02-08 PROCEDURE — 86360 T CELL ABSOLUTE COUNT/RATIO: CPT

## 2024-02-08 PROCEDURE — 99214 OFFICE O/P EST MOD 30 MIN: CPT | Mod: PBBFAC | Performed by: NURSE PRACTITIONER

## 2024-02-08 PROCEDURE — 1159F MED LIST DOCD IN RCRD: CPT | Mod: CPTII,,, | Performed by: NURSE PRACTITIONER

## 2024-02-08 PROCEDURE — 3078F DIAST BP <80 MM HG: CPT | Mod: CPTII,,, | Performed by: NURSE PRACTITIONER

## 2024-02-08 PROCEDURE — 87536 HIV-1 QUANT&REVRSE TRNSCRPJ: CPT

## 2024-02-08 PROCEDURE — 3008F BODY MASS INDEX DOCD: CPT | Mod: CPTII,,, | Performed by: NURSE PRACTITIONER

## 2024-02-08 PROCEDURE — 0219U NFCT AGT HIV GNRJ SEQ ALYS: CPT

## 2024-02-08 RX ORDER — NITROFURANTOIN 25; 75 MG/1; MG/1
100 CAPSULE ORAL 2 TIMES DAILY
Qty: 14 CAPSULE | Refills: 0 | Status: SHIPPED | OUTPATIENT
Start: 2024-02-08 | End: 2024-02-12

## 2024-02-08 RX ORDER — ABACAVIR SULFATE, DOLUTEGRAVIR SODIUM, LAMIVUDINE 600; 50; 300 MG/1; MG/1; MG/1
1 TABLET, FILM COATED ORAL DAILY
Qty: 30 TABLET | Refills: 3 | Status: CANCELLED | OUTPATIENT
Start: 2024-02-08

## 2024-02-08 RX ORDER — BICTEGRAVIR SODIUM, EMTRICITABINE, AND TENOFOVIR ALAFENAMIDE FUMARATE 50; 200; 25 MG/1; MG/1; MG/1
1 TABLET ORAL DAILY
Qty: 30 TABLET | Refills: 3 | Status: SHIPPED | OUTPATIENT
Start: 2024-02-08

## 2024-02-08 RX ORDER — ERGOCALCIFEROL 1.25 MG/1
50000 CAPSULE ORAL
Qty: 5 CAPSULE | Refills: 3 | Status: SHIPPED | OUTPATIENT
Start: 2024-02-08 | End: 2024-06-17 | Stop reason: SDUPTHER

## 2024-02-08 NOTE — TELEPHONE ENCOUNTER
Patient returned call. She will go to [facility] for repeat potassium level. She is asymptomatic with mild hyperkalemia vs hemolyzed specimen.     Urine indicative of UTI, culture pending. Will initiate treatment today & luli to c & s as needed. Advised to drink lots of water, urinate regularly without holding urine, urinate before & after sex, and wipe from front to back. Complete entire course of antibiotics as prescribed. Voiced appreciation.

## 2024-02-08 NOTE — TELEPHONE ENCOUNTER
----- Message from Hien Harden sent at 2/8/2024  3:14 PM CST -----  Pt juana Patel      Pt need results sent to [facility]. Pt don't know  fax number.   Pt number [phone]      Please advise

## 2024-02-08 NOTE — TELEPHONE ENCOUNTER
Potassium level 5.6. Attempted to phone pt, no answer & no VM.  This may be due to blood clotting.  I would like for her to have potassium level repeated, she can go to the facility of her choice & we can send orders. Please continue to try & reach her. Thank you.    Rhombic Flap Text: The defect edges were debeveled with a #15 scalpel blade.  Given the location of the defect and the proximity to free margins a rhombic flap was deemed most appropriate.  Using a sterile surgical marker, an appropriate rhombic flap was drawn incorporating the defect.    The area thus outlined was incised deep to adipose tissue with a #15 scalpel blade.  The skin margins were undermined to an appropriate distance in all directions utilizing iris scissors.

## 2024-02-08 NOTE — PROGRESS NOTES
"Patient ID: Amy Hanson 48 y.o.     Chief Complaint:   Chief Complaint   Patient presents with    Followup HIV     States "bump" noted to both lower arms and hurts with palpation        HPI:    2/8/24  Amy is a 47 yo WF here today for HIV f/u visit. She is accompanied by her sister who is aware of her status. Last labs VL 1.21 mil, CD4 347. She tells me that she took Triumeq for a couple of months after last visit, but ran out & has been off since November 2023. She missed a couple of f/u appointments, but is now residing with sister who will help her with appointments. Genotype results reviewed, mutations noted. Due to high viral load, will revise ART to Biktarvy 1 po daily. Pt voiced appreciation and understanding. No DDIs noted with  medications. She remains clean & sober. Mood stable. She tells me that she is having urinary frequency & odor. Denies burning or pain. Last sexual encounter 3 months ago, protected. Never had anal intercourse. Due for cervical pap, amenable to same. STI screenings completed. Due for MMG order placed. She did return stool sample for cologuard, but was not able to be resulted. She declines to repeat testing at this juncture. Will defer to PCP. Declines flu vaccine today. She appreciates prescription for weekly vitamin D as well. She also tells me that she has a lump noted about 3 months ago to both forearms, tender with deep palpation. Soft, mobile. Will get u/s for further evaluation, voiced appreciation.  All questions answered & concerns addressed.    8/17/23  Amy is a 46 yo WF presenting today for HIV return visit.  She tells me that she has been off Triumeq for about 6 months now, fell off the wagon with substance abuse and was sent to treatment facility by family.  She has been at [facility] in [place] for a couple of weeks now & is doing much better. Ready to resume ART. Last labs 7/22 VL 216251, CD4 556.  She is on menstrual cycle today, but is due for pap smear & " will plan for next visit. MMG & Cologuard ordered, agrees to follow through with recommended screenings.  No family history of colon cancer, no rectal bleeding. Feeling hopeful with treatment facility & good overall today.      7/18/22  Amy is a 45 yo HIV + WF presenting today for HIV f/u visit.  She tells me that she has been off Triumeq x 3 months now due to missed appointments.  She states that she was going to the Methadone clinic and was having trouble with transportation getting her here as well.  She is no longer going to Methadone clinic & will be able to attend visits as scheduled.  She has not followed up with Dr. Hooker for cervical pap.  Amenable to having pap smear here, but she is currently on menstrual cycle.  Will plan for pap at next visit in 6 weeks.  Past due for MMG, order placed to schedule at [facility].  She has no family history of colon cancer, no rectal bleeding.  Cologuard ordered for colon cancer screening.  She is not currently taking vitamin d, will check level today.  No new sexual partners, same male partner x 5 years.  She tells me that she has new onset seizure like activity since last visit with me. Will seek PCP evaluation for same ASAP.  All questions answered & concerns addressed.            Past Medical History:   Diagnosis Date    Anemia     Bipolar disorder     Depression     HIV infection     Mitral valve prolapse     Seizures         Past Surgical History:   Procedure Laterality Date    FACIAL FRACTURE SURGERY      TUBAL LIGATION          Social History     Socioeconomic History    Marital status:    Tobacco Use    Smoking status: Every Day     Types: Vaping with nicotine    Smokeless tobacco: Never   Substance and Sexual Activity    Alcohol use: Never    Drug use: Yes     Types: Marijuana     Comment: Daily    Sexual activity: Not Currently     Partners: Male     Birth control/protection: Condom        Family History   Problem Relation Age of Onset    Heart  "disease Mother     Heart attack Mother     Hypertension Father     COPD Sister     Intellectual disability Sister         Review of patient's allergies indicates:  No Known Allergies     Immunization History   Administered Date(s) Administered    HPV 9-Valent 02/22/2019, 09/16/2019, 03/06/2020    Hepatitis A / Hepatitis B 08/21/2009    Influenza - Quadrivalent - PF (6-35 months) 03/06/2020    Influenza - Quadrivalent - PF *Preferred* (6 months and older) 10/02/2009, 10/30/2013    Influenza - Trivalent - PF (ADULT) 10/02/2009, 10/30/2013    Meningococcal Conjugate (MCV4P) 02/22/2019, 09/16/2019    Pneumococcal Conjugate - 13 Valent 06/02/2016    Pneumococcal Conjugate - 7 Valent 01/18/2008    Pneumococcal Polysaccharide - 23 Valent 01/18/2008, 03/27/2017    Tdap 06/02/2016        Review of Systems   Constitutional: Negative.    HENT: Negative.     Eyes: Negative.    Respiratory: Negative.     Cardiovascular: Negative.    Gastrointestinal: Negative.    Genitourinary:  Positive for frequency.   Musculoskeletal: Negative.    Skin: Negative.    Neurological: Negative.    Endo/Heme/Allergies: Negative.    Psychiatric/Behavioral: Negative.     All other systems reviewed and are negative.         Objective:      /75 (BP Location: Left arm, Patient Position: Sitting, BP Method: Large (Automatic))   Pulse 88   Temp 98.1 °F (36.7 °C) (Oral)   Resp 16   Ht 5' 4" (1.626 m)   Wt 84.3 kg (185 lb 12.8 oz)   BMI 31.89 kg/m²      Physical Exam  Vitals reviewed. Exam conducted with a chaperone present.   Constitutional:       General: She is not in acute distress.     Appearance: Normal appearance. She is not toxic-appearing.   HENT:      Mouth/Throat:      Mouth: Mucous membranes are moist.      Pharynx: Oropharynx is clear.   Eyes:      Conjunctiva/sclera: Conjunctivae normal.   Cardiovascular:      Rate and Rhythm: Normal rate and regular rhythm.      Heart sounds: Normal heart sounds.   Pulmonary:      Effort: " Pulmonary effort is normal. No respiratory distress.      Breath sounds: Normal breath sounds.   Abdominal:      General: Bowel sounds are normal.      Palpations: Abdomen is soft.      Tenderness: There is no abdominal tenderness.   Genitourinary:     General: Normal vulva.      Exam position: Lithotomy position.      Pubic Area: No rash.       Labia:         Right: No rash, tenderness or lesion.         Left: No rash, tenderness or lesion.       Vagina: Vaginal discharge present. No tenderness or lesions.      Cervix: Normal.      Uterus: Normal.       Adnexa: Right adnexa normal and left adnexa normal.      Rectum: Normal.   Musculoskeletal:         General: Normal range of motion.        Arms:       Cervical back: Normal range of motion.   Lymphadenopathy:      Cervical: Cervical adenopathy present.   Skin:     General: Skin is warm and dry.   Neurological:      General: No focal deficit present.      Mental Status: She is alert and oriented to person, place, and time. Mental status is at baseline.   Psychiatric:         Mood and Affect: Mood normal.         Behavior: Behavior normal.          Labs:   Lab Results   Component Value Date    WBC 5.86 02/08/2024    HGB 12.5 02/08/2024    HCT 38.9 02/08/2024    MCV 93.3 02/08/2024     02/08/2024       CMP  Sodium Level   Date Value Ref Range Status   12/23/2023 138 136 - 145 mmol/L Final     Potassium Level   Date Value Ref Range Status   12/23/2023 4.0 3.5 - 5.1 mmol/L Final     Carbon Dioxide   Date Value Ref Range Status   12/23/2023 24 22 - 29 mmol/L Final     Blood Urea Nitrogen   Date Value Ref Range Status   12/23/2023 13.0 7.0 - 18.7 mg/dL Final     Creatinine   Date Value Ref Range Status   12/23/2023 1.01 0.55 - 1.02 mg/dL Final     Calcium Level Total   Date Value Ref Range Status   12/23/2023 9.4 8.4 - 10.2 mg/dL Final     Albumin Level   Date Value Ref Range Status   12/23/2023 4.0 3.5 - 5.0 g/dL Final     Bilirubin Total   Date Value Ref Range  Status   12/23/2023 0.2 <=1.5 mg/dL Final     Alkaline Phosphatase   Date Value Ref Range Status   12/23/2023 78 40 - 150 unit/L Final     Aspartate Aminotransferase   Date Value Ref Range Status   12/23/2023 16 5 - 34 unit/L Final     Alanine Aminotransferase   Date Value Ref Range Status   12/23/2023 11 0 - 55 unit/L Final     eGFR   Date Value Ref Range Status   12/23/2023 >60 mls/min/1.73/m2 Final     Lab Results   Component Value Date    TSH 1.036 08/17/2023     Hep C Ab Interp   Date Value Ref Range Status   08/17/2023 Nonreactive Nonreactive Final     Syphilis Antibody   Date Value Ref Range Status   08/17/2023 Nonreactive Nonreactive, Equivocal Final     Cholesterol Total   Date Value Ref Range Status   08/17/2023 155 <=200 mg/dL Final     HDL Cholesterol   Date Value Ref Range Status   08/17/2023 48 35 - 60 mg/dL Final     Triglyceride   Date Value Ref Range Status   08/17/2023 65 37 - 140 mg/dL Final     Cholesterol/HDL Ratio   Date Value Ref Range Status   08/17/2023 3 0 - 5 Final     Very Low Density Lipoprotein   Date Value Ref Range Status   08/17/2023 13  Final     LDL Cholesterol   Date Value Ref Range Status   08/17/2023 94.00 50.00 - 140.00 mg/dL Final     Vit D 25 OH   Date Value Ref Range Status   08/17/2023 17.7 (L) 30.0 - 80.0 ng/mL Final     Results for orders placed or performed in visit on 08/17/23   CD4 Lymphocytes   Result Value Ref Range    Patient Age 47     WBC Absolute 6,600 4,500 - 11,500 /mm3    Lymph Percent 39 28 - 48 %    Lymph Absolute 2,574 1,260 - 5,520 x10(3)/mcL    CD4 % 13.5 %    CD4 Absolute 347.49 (L) 589 - 1,505 unit/L    T Cell Interp       Normal absolute lymphocyte count with decreased absolute CD4+ lymphocyte count.    Rohit Navarro M.D.     Narrative    This test was developed and its performance characteristics determined by [facility]. It has not been cleared or approved by the US Food and Drug Administration. The FDA does not  require this test to go through premarket FDA review. This test is used for clinical purposes. It should not be regarded as investigational or for research. This laboratory is certified under the Clinical Laboratory Improvement Amendments (CLIA) as qualified to perform high complexity clinical laboratory testing.     Results for orders placed or performed in visit on 08/17/23   HIV-1 RNA, Quantitative, PCR with Reflex to Genotype   Result Value Ref Range    HIV-1 RNA Detect/Quant, P 5635819 (A) Undetected copies/mL     Results for orders placed or performed in visit on 08/17/23   Quantiferon Gold TB   Result Value Ref Range    QuantiFERON-Tb Gold Plus Result Negative Negative    TB1 Ag minus Nil Result 0.04 IU/mL    TB2 Ag minus Nil Result 0.02 IU/mL    Mitogen minus Nil Result 9.93 IU/mL    Nil Result 0.07 IU/mL     Results for orders placed or performed in visit on 08/17/23   C.trach/N.gonor AMP RNA   Result Value Ref Range    Chlamydia trachomatis amplified RNA Negative Negative    Neisseria gonorrhoeae amplified RNA Negative Negative    Source THROAT     SOURCE: THROAT      Results for orders placed or performed in visit on 08/17/23   Urinalysis   Result Value Ref Range    Color, UA Light-Yellow Yellow, Light-Yellow, Dark Yellow, Mary, Straw    Appearance, UA Clear Clear    Specific Gravity, UA 1.006     pH, UA 5.5 5.0 - 8.5    Protein, UA Negative Negative    Glucose, UA Normal Negative, Normal    Ketones, UA Negative Negative    Blood, UA Negative Negative    Bilirubin, UA Negative Negative    Urobilinogen, UA Normal 0.2, 1.0, Normal    Nitrites, UA Negative Negative    Leukocyte Esterase, UA Negative Negative    WBC, UA 0-5 None Seen, 0-2, 3-5, 0-5 /HPF    Bacteria, UA Few (A) None Seen /HPF    Squamous Epithelial Cells, UA Moderate (A) None Seen /HPF    Mucous, UA Trace (A) None Seen /LPF    Hyaline Casts, UA None Seen None Seen /lpf    RBC, UA 0-5 None Seen, 0-2, 3-5, 0-5 /HPF       Imaging: Reviewed most  recent relevant imaging studies available, notable results highlighted in this note    Medications:     Current Outpatient Medications   Medication Instructions    albuterol (PROVENTIL HFA) 90 mcg/actuation inhaler 2 puffs, Inhalation, Every 6 hours PRN, Rescue    fuibmauwh-lkpkjtbv-rltpqvz ala (BIKTARVY) -25 mg (25 kg or greater) 1 tablet, Oral, Daily    busPIRone (BUSPAR) 7.5 mg, Oral, 2 times daily    cyanocobalamin (VITAMIN B-12) 100 mcg, Oral    divalproex ER (DEPAKOTE ER) 500 mg, Oral, 3 times daily    ergocalciferol (ERGOCALCIFEROL) 50,000 Units, Oral, Every 7 days    FLUoxetine 40 mg, Oral    naloxone (NARCAN) 4 mg/actuation Spry 4mg by nasal route as needed for opioid overdose; may repeat every 2-3 minutes in alternating nostrils until medical help arrives. Call 911    QUEtiapine (SEROQUEL) 600 mg, Oral, Nightly       Assessment:       Problem List Items Addressed This Visit    None  Visit Diagnoses       HIV disease    -  Primary    Relevant Medications    sixkcvpay-vqzstpgh-eexbemz ala (BIKTARVY) -25 mg (25 kg or greater)    ergocalciferol (ERGOCALCIFEROL) 50,000 unit Cap    Other Relevant Orders    CBC Auto Differential (Completed)    Comprehensive Metabolic Panel    HIV-1 RNA, Quantitative, PCR with Reflex to Genotype    CD4 Lymphocytes    Vitamin D deficiency        Cervical cancer screening        Relevant Orders    Liquid-Based Pap Smear, Screening Screening    Encounter for screening mammogram for malignant neoplasm of breast        Relevant Orders    Mammo Digital Screening Bilat    Urinary frequency        Relevant Orders    Urinalysis (Completed)    Urine culture    Subcutaneous mass of both upper extremities        Relevant Orders    US Extremity Non Vascular Limited Bilat               Plan:      HIV disease  -     qbumnuqwp-wztzkyyt-phnrxbs ala (BIKTARVY) -25 mg (25 kg or greater); Take 1 tablet by mouth once daily.  Dispense: 30 tablet; Refill: 3  -     CBC Auto  Differential; Future; Expected date: 02/08/2024  -     Comprehensive Metabolic Panel; Future; Expected date: 02/08/2024  -     HIV-1 RNA, Quantitative, PCR with Reflex to Genotype; Future; Expected date: 02/08/2024  -     CD4 Lymphocytes; Future; Expected date: 02/08/2024  -     ergocalciferol (ERGOCALCIFEROL) 50,000 unit Cap; Take 1 capsule (50,000 Units total) by mouth every 7 days.  Dispense: 5 capsule; Refill: 3  Diagnosed 7/24/2006.  Extensive adherence and sexual health counseling done.  Education provided for patient & her sister whom she is currently residing with.  Use condoms for all sexual encounters.  Blood precautions.  Revise ART to Biktarvy 1 po daily due to high viral load (>1 million copies 8/23).  Labs today as ordered.  RTC 6 weeks with Amanda.      Wellness:  Cervical pap: ~2019 Dr. Hooker, 2/8/24  Anal pap: 9/2019 NIL  Cervical CT/GC: 8/23 Neg, 2/24  Anal CT/GC: 9/2019 Neg  Oral CT/GC: 9/2019 Neg, 8/23 Neg  Eye exam: 9/2019  MMG: ~2019 [facility], ordered 2/24  DEXA: N/A  Colon cancer screen: Cologuard not resulted 10/23. Declines repeat order at this time 2/24.    Vitamin D deficiency  Ergocalciferol 99759 units weekly.     Cervical cancer screening  -     Liquid-Based Pap Smear, Screening Screening  Cervical pap collected with STI screenings.     Encounter for screening mammogram for malignant neoplasm of breast  -     Mammo Digital Screening Bilat; Future; Expected date: 03/08/2024  MMG next available.    Urinary frequency  -     Urinalysis  -     Urine culture  UA today with reflex to culture.     Subcutaneous mass of both upper extremities  -     US Extremity Non Vascular Limited Bilat; Future; Expected date: 02/22/2024  U/S of forearm lesions as ordered.              I spent a total of  65  minutes on the day of the visit.  This includes face to face time and non-face to face time preparing to see the patient (eg, review of tests), obtaining and/or reviewing separately obtained  history, documenting clinical information in the electronic or other health record, independently interpreting results and communicating results to the patient/family/caregiver, or care coordinator.

## 2024-02-09 ENCOUNTER — LAB VISIT (OUTPATIENT)
Dept: LAB | Facility: HOSPITAL | Age: 49
End: 2024-02-09
Attending: NURSE PRACTITIONER
Payer: MEDICAID

## 2024-02-09 DIAGNOSIS — E87.5 HYPERKALEMIA: ICD-10-CM

## 2024-02-09 LAB — POTASSIUM SERPL-SCNC: 4.4 MMOL/L (ref 3.5–5.1)

## 2024-02-09 PROCEDURE — 84132 ASSAY OF SERUM POTASSIUM: CPT

## 2024-02-09 PROCEDURE — 36415 COLL VENOUS BLD VENIPUNCTURE: CPT

## 2024-02-09 NOTE — TELEPHONE ENCOUNTER
Phoned patient. Informed that all results will not be complete until next week. To send results to Francisca Camejo when all are completed. Voiced understanding and appreciates call.

## 2024-02-09 NOTE — TELEPHONE ENCOUNTER
----- Message from CECILIA Carter sent at 2/9/2024 10:09 AM CST -----  Please let patient know that repeat K was normal.

## 2024-02-10 LAB
BACTERIA UR CULT: ABNORMAL
HIV1 RNA # PLAS NAA DL=20: ABNORMAL COPIES/ML

## 2024-02-11 LAB — MAYO GENERIC ORDERABLE RESULT: ABNORMAL

## 2024-02-12 ENCOUNTER — TELEPHONE (OUTPATIENT)
Dept: INFECTIOUS DISEASES | Facility: CLINIC | Age: 49
End: 2024-02-12
Payer: MEDICAID

## 2024-02-12 DIAGNOSIS — N30.00 ACUTE CYSTITIS WITHOUT HEMATURIA: Primary | ICD-10-CM

## 2024-02-12 RX ORDER — CIPROFLOXACIN 500 MG/1
500 TABLET ORAL EVERY 12 HOURS
Qty: 6 TABLET | Refills: 0 | Status: SHIPPED | OUTPATIENT
Start: 2024-02-12 | End: 2024-02-15

## 2024-02-12 NOTE — TELEPHONE ENCOUNTER
Urine culture & sensitivity results reviewed. Will need to revise her antibiotic treatment based on results.  Will revise to Cipro 500 mg bid x 3 days. Prescription sent. Please notify pt. Thank you.

## 2024-02-14 ENCOUNTER — HOSPITAL ENCOUNTER (OUTPATIENT)
Dept: RADIOLOGY | Facility: HOSPITAL | Age: 49
Discharge: HOME | End: 2024-02-14
Attending: NURSE PRACTITIONER
Payer: MEDICAID

## 2024-02-14 DIAGNOSIS — Z12.31 ENCOUNTER FOR SCREENING MAMMOGRAM FOR MALIGNANT NEOPLASM OF BREAST: ICD-10-CM

## 2024-02-14 DIAGNOSIS — R22.33 SUBCUTANEOUS MASS OF BOTH UPPER EXTREMITIES: ICD-10-CM

## 2024-02-14 PROCEDURE — 76882 US LMTD JT/FCL EVL NVASC XTR: CPT | Mod: TC,RT

## 2024-02-14 PROCEDURE — 77067 SCR MAMMO BI INCL CAD: CPT | Mod: TC

## 2024-02-14 PROCEDURE — 77067 SCR MAMMO BI INCL CAD: CPT | Mod: 26,,, | Performed by: RADIOLOGY

## 2024-02-14 PROCEDURE — 77063 BREAST TOMOSYNTHESIS BI: CPT | Mod: 26,,, | Performed by: RADIOLOGY

## 2024-02-15 ENCOUNTER — TELEPHONE (OUTPATIENT)
Dept: INFECTIOUS DISEASES | Facility: CLINIC | Age: 49
End: 2024-02-15
Payer: MEDICAID

## 2024-02-15 DIAGNOSIS — D17.20 LIPOMA OF UPPER EXTREMITY, UNSPECIFIED LATERALITY: Primary | ICD-10-CM

## 2024-02-15 DIAGNOSIS — B20 HIV DISEASE: ICD-10-CM

## 2024-02-15 DIAGNOSIS — Z16.33 DRUG RESISTANCE TO ANTIRETROVIRAL THERAPY: ICD-10-CM

## 2024-02-15 LAB — PSYCHE PATHOLOGY RESULT: NORMAL

## 2024-02-15 NOTE — TELEPHONE ENCOUNTER
Results reviewed.  Phoned pt with results.     U/S bilateral upper extremity reveals bilateral lipomas. She tells me that they do remain tender to touch and would like to have them surgically removed. Advised pt that I will refer her to general surgery, but she will likely need to achieve viral suppression prior to being a good candidate for non-emergent surgery. Voiced understanding.    HIV genotype results reviewed.  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 She has started ART with Biktarvy as prescribed. Will enhance with Pifeltro 100 mg daily. Fully susceptible virus to emtricitabine & bictegravir. Strongly encouraged 100% adherence. Voiced understanding & intent to do so.     She tells me that she is taking Cipro as prescribed and UTI symptoms are resolving.     Cervical Pap NIL, negative for infection as well.

## 2024-03-25 ENCOUNTER — OFFICE VISIT (OUTPATIENT)
Dept: INFECTIOUS DISEASES | Facility: CLINIC | Age: 49
End: 2024-03-25
Payer: MEDICAID

## 2024-03-25 VITALS
HEART RATE: 77 BPM | TEMPERATURE: 98 F | RESPIRATION RATE: 16 BRPM | DIASTOLIC BLOOD PRESSURE: 74 MMHG | WEIGHT: 188.19 LBS | HEIGHT: 64 IN | BODY MASS INDEX: 32.13 KG/M2 | SYSTOLIC BLOOD PRESSURE: 121 MMHG

## 2024-03-25 DIAGNOSIS — Z16.33 DRUG RESISTANCE TO ANTIRETROVIRAL THERAPY: ICD-10-CM

## 2024-03-25 DIAGNOSIS — B20 HIV DISEASE: Primary | ICD-10-CM

## 2024-03-25 LAB
ALBUMIN SERPL-MCNC: 3.8 G/DL (ref 3.5–5)
ALBUMIN/GLOB SERPL: 1 RATIO (ref 1.1–2)
ALP SERPL-CCNC: 70 UNIT/L (ref 40–150)
ALT SERPL-CCNC: 8 UNIT/L (ref 0–55)
APPEARANCE UR: ABNORMAL
AST SERPL-CCNC: 15 UNIT/L (ref 5–34)
BACTERIA #/AREA URNS AUTO: ABNORMAL /HPF
BASOPHILS # BLD AUTO: 0.02 X10(3)/MCL
BASOPHILS NFR BLD AUTO: 0.3 %
BILIRUB SERPL-MCNC: 0.4 MG/DL
BILIRUB UR QL STRIP.AUTO: NEGATIVE
BUN SERPL-MCNC: 6.4 MG/DL (ref 7–18.7)
CALCIUM SERPL-MCNC: 9.5 MG/DL (ref 8.4–10.2)
CHLORIDE SERPL-SCNC: 103 MMOL/L (ref 98–107)
CO2 SERPL-SCNC: 24 MMOL/L (ref 22–29)
COLOR UR AUTO: ABNORMAL
CREAT SERPL-MCNC: 0.82 MG/DL (ref 0.55–1.02)
EOSINOPHIL # BLD AUTO: 0.02 X10(3)/MCL (ref 0–0.9)
EOSINOPHIL NFR BLD AUTO: 0.3 %
ERYTHROCYTE [DISTWIDTH] IN BLOOD BY AUTOMATED COUNT: 13 % (ref 11.5–17)
GFR SERPLBLD CREATININE-BSD FMLA CKD-EPI: >60 MLS/MIN/1.73/M2
GLOBULIN SER-MCNC: 3.9 GM/DL (ref 2.4–3.5)
GLUCOSE SERPL-MCNC: 75 MG/DL (ref 74–100)
GLUCOSE UR QL STRIP.AUTO: NORMAL
HCT VFR BLD AUTO: 38.8 % (ref 37–47)
HGB BLD-MCNC: 12.9 G/DL (ref 12–16)
HYALINE CASTS #/AREA URNS LPF: ABNORMAL /LPF
IMM GRANULOCYTES # BLD AUTO: 0.01 X10(3)/MCL (ref 0–0.04)
IMM GRANULOCYTES NFR BLD AUTO: 0.2 %
KETONES UR QL STRIP.AUTO: NEGATIVE
LEUKOCYTE ESTERASE UR QL STRIP.AUTO: 250
LYMPHOCYTES # BLD AUTO: 2.64 X10(3)/MCL (ref 0.6–4.6)
LYMPHOCYTES NFR BLD AUTO: 42 %
MCH RBC QN AUTO: 30.2 PG (ref 27–31)
MCHC RBC AUTO-ENTMCNC: 33.2 G/DL (ref 33–36)
MCV RBC AUTO: 90.9 FL (ref 80–94)
MONOCYTES # BLD AUTO: 0.32 X10(3)/MCL (ref 0.1–1.3)
MONOCYTES NFR BLD AUTO: 5.1 %
NEUTROPHILS # BLD AUTO: 3.27 X10(3)/MCL (ref 2.1–9.2)
NEUTROPHILS NFR BLD AUTO: 52.1 %
NITRITE UR QL STRIP.AUTO: ABNORMAL
NRBC BLD AUTO-RTO: 0 %
PH UR STRIP.AUTO: 7.5 [PH]
PLATELET # BLD AUTO: 203 X10(3)/MCL (ref 130–400)
PMV BLD AUTO: 10 FL (ref 7.4–10.4)
POTASSIUM SERPL-SCNC: 3.9 MMOL/L (ref 3.5–5.1)
PROT SERPL-MCNC: 7.7 GM/DL (ref 6.4–8.3)
PROT UR QL STRIP.AUTO: NEGATIVE
RBC # BLD AUTO: 4.27 X10(6)/MCL (ref 4.2–5.4)
RBC #/AREA URNS AUTO: ABNORMAL /HPF
RBC UR QL AUTO: NEGATIVE
SODIUM SERPL-SCNC: 136 MMOL/L (ref 136–145)
SP GR UR STRIP.AUTO: 1.01 (ref 1–1.03)
SQUAMOUS #/AREA URNS LPF: ABNORMAL /HPF
UROBILINOGEN UR STRIP-ACNC: NORMAL
WBC # SPEC AUTO: 6.28 X10(3)/MCL (ref 4.5–11.5)
WBC #/AREA URNS AUTO: ABNORMAL /HPF

## 2024-03-25 PROCEDURE — 36415 COLL VENOUS BLD VENIPUNCTURE: CPT | Performed by: NURSE PRACTITIONER

## 2024-03-25 PROCEDURE — 1159F MED LIST DOCD IN RCRD: CPT | Mod: CPTII,,, | Performed by: NURSE PRACTITIONER

## 2024-03-25 PROCEDURE — 86360 T CELL ABSOLUTE COUNT/RATIO: CPT | Performed by: NURSE PRACTITIONER

## 2024-03-25 PROCEDURE — 81001 URINALYSIS AUTO W/SCOPE: CPT | Performed by: NURSE PRACTITIONER

## 2024-03-25 PROCEDURE — 99214 OFFICE O/P EST MOD 30 MIN: CPT | Mod: S$PBB,,, | Performed by: NURSE PRACTITIONER

## 2024-03-25 PROCEDURE — 3078F DIAST BP <80 MM HG: CPT | Mod: CPTII,,, | Performed by: NURSE PRACTITIONER

## 2024-03-25 PROCEDURE — 87536 HIV-1 QUANT&REVRSE TRNSCRPJ: CPT | Performed by: NURSE PRACTITIONER

## 2024-03-25 PROCEDURE — 99214 OFFICE O/P EST MOD 30 MIN: CPT | Mod: PBBFAC | Performed by: NURSE PRACTITIONER

## 2024-03-25 PROCEDURE — 80053 COMPREHEN METABOLIC PANEL: CPT | Performed by: NURSE PRACTITIONER

## 2024-03-25 PROCEDURE — 87086 URINE CULTURE/COLONY COUNT: CPT | Performed by: NURSE PRACTITIONER

## 2024-03-25 PROCEDURE — 1160F RVW MEDS BY RX/DR IN RCRD: CPT | Mod: CPTII,,, | Performed by: NURSE PRACTITIONER

## 2024-03-25 PROCEDURE — 3008F BODY MASS INDEX DOCD: CPT | Mod: CPTII,,, | Performed by: NURSE PRACTITIONER

## 2024-03-25 PROCEDURE — 3074F SYST BP LT 130 MM HG: CPT | Mod: CPTII,,, | Performed by: NURSE PRACTITIONER

## 2024-03-25 PROCEDURE — 85025 COMPLETE CBC W/AUTO DIFF WBC: CPT | Performed by: NURSE PRACTITIONER

## 2024-03-25 NOTE — PROGRESS NOTES
Patient ID: Amy Hanson 48 y.o.     Chief Complaint:   Chief Complaint   Patient presents with    Followup HIV     Denies problems        HPI:    3/25/24  Amy is a 49 yo WF presenting today for HIV f/u visit, accompanied by sister. She is taking ART as prescribed, Biktarvy and Pifeltro daily due to MDR virus. She is tolerating it well, no noted side effects and no missed doses. She completed antimicrobials for UTI but is still having some urgency and frequency. Will repeat clean catch UA today to assess for bacterial eradication.  Pap smear NIL. MMG BI-RADS 1. She is taking vitamin D weekly as prescribed. She has an appointment tomorrow with surgery clinic for evaluation of lipoma to forearm. She remains clean and sober, still residing with sister at this time. All questions answered & concerns addressed.    2/8/24  Amy is a 49 yo WF here today for HIV f/u visit. She is accompanied by her sister who is aware of her status. Last labs VL 1.21 mil, CD4 347. She tells me that she took Triumeq for a couple of months after last visit, but ran out & has been off since November 2023. She missed a couple of f/u appointments, but is now residing with sister who will help her with appointments. Genotype results reviewed, mutations noted. Due to high viral load, will revise ART to Biktarvy 1 po daily. Pt voiced appreciation and understanding. No DDIs noted with  medications. She remains clean & sober. Mood stable. She tells me that she is having urinary frequency & odor. Denies burning or pain. Last sexual encounter 3 months ago, protected. Never had anal intercourse. Due for cervical pap, amenable to same. STI screenings completed. Due for MMG order placed. She did return stool sample for cologuard, but was not able to be resulted. She declines to repeat testing at this juncture. Will defer to PCP. Declines flu vaccine today. She appreciates prescription for weekly vitamin D as well. She also tells me that she  has a lump noted about 3 months ago to both forearms, tender with deep palpation. Soft, mobile. Will get u/s for further evaluation, voiced appreciation.  All questions answered & concerns addressed.     8/17/23  Amy is a 48 yo WF presenting today for HIV return visit.  She tells me that she has been off Triumeq for about 6 months now, fell off the wagon with substance abuse and was sent to treatment facility by family.  She has been at [facility] in [place] for a couple of weeks now & is doing much better. Ready to resume ART. Last labs 7/22 VL 297144, CD4 556.  She is on menstrual cycle today, but is due for pap smear & will plan for next visit. MMG & Cologuard ordered, agrees to follow through with recommended screenings.  No family history of colon cancer, no rectal bleeding. Feeling hopeful with treatment facility & good overall today.            Past Medical History:   Diagnosis Date    Anemia     Bipolar disorder     Depression     HIV infection     Mitral valve prolapse     Seizures         Past Surgical History:   Procedure Laterality Date    FACIAL FRACTURE SURGERY      MOUTH SURGERY  03/08/2024    TUBAL LIGATION          Social History     Socioeconomic History    Marital status:    Tobacco Use    Smoking status: Every Day     Types: Vaping with nicotine    Smokeless tobacco: Never   Substance and Sexual Activity    Alcohol use: Never    Drug use: Yes     Types: Marijuana     Comment: Daily    Sexual activity: Not Currently     Partners: Male     Birth control/protection: Condom        Family History   Problem Relation Age of Onset    Heart disease Mother     Heart attack Mother     Hypertension Father     COPD Sister     Intellectual disability Sister         Review of patient's allergies indicates:  No Known Allergies     Immunization History   Administered Date(s) Administered    HPV 9-Valent 02/22/2019, 09/16/2019, 03/06/2020    Hepatitis A / Hepatitis B 08/21/2009    Influenza - Quadrivalent  "- PF (6-35 months) 03/06/2020    Influenza - Quadrivalent - PF *Preferred* (6 months and older) 10/02/2009, 10/30/2013    Influenza - Trivalent (ADULT) 10/02/2009, 10/30/2013    Influenza - Trivalent - PF (ADULT) 10/02/2009, 10/30/2013    Meningococcal Conjugate (MCV4P) 02/22/2019, 09/16/2019    Pneumococcal Conjugate - 13 Valent 06/02/2016    Pneumococcal Conjugate - 7 Valent 01/18/2008    Pneumococcal Polysaccharide - 23 Valent 01/18/2008, 03/27/2017    Tdap 06/02/2016        Review of Systems   Constitutional: Negative.    HENT: Negative.     Eyes: Negative.    Respiratory: Negative.     Cardiovascular: Negative.    Gastrointestinal: Negative.    Genitourinary: Negative.    Musculoskeletal: Negative.    Skin: Negative.    Neurological: Negative.    Endo/Heme/Allergies: Negative.    Psychiatric/Behavioral: Negative.     All other systems reviewed and are negative.         Objective:      /74 (BP Location: Right arm, Patient Position: Sitting, BP Method: Large (Automatic))   Pulse 77   Temp 98.3 °F (36.8 °C) (Oral)   Resp 16   Ht 5' 4" (1.626 m)   Wt 85.3 kg (188 lb 2.6 oz)   BMI 32.30 kg/m²      Physical Exam  Vitals reviewed.   Constitutional:       General: She is not in acute distress.     Appearance: Normal appearance. She is not toxic-appearing.   HENT:      Mouth/Throat:      Mouth: Mucous membranes are moist.      Pharynx: Oropharynx is clear.   Eyes:      Conjunctiva/sclera: Conjunctivae normal.   Cardiovascular:      Rate and Rhythm: Normal rate and regular rhythm.   Pulmonary:      Effort: Pulmonary effort is normal. No respiratory distress.      Breath sounds: Normal breath sounds.   Abdominal:      General: Abdomen is flat. Bowel sounds are normal.      Palpations: Abdomen is soft.   Musculoskeletal:         General: Normal range of motion.      Cervical back: Normal range of motion.   Lymphadenopathy:      Cervical: No cervical adenopathy.   Skin:     General: Skin is warm and dry. "   Neurological:      General: No focal deficit present.      Mental Status: She is alert and oriented to person, place, and time. Mental status is at baseline.   Psychiatric:         Mood and Affect: Mood normal.         Behavior: Behavior normal.          Labs:   Lab Results   Component Value Date    WBC 5.86 02/08/2024    HGB 12.5 02/08/2024    HCT 38.9 02/08/2024    MCV 93.3 02/08/2024     02/08/2024       CMP  Sodium Level   Date Value Ref Range Status   02/08/2024 141 136 - 145 mmol/L Final     Potassium Level   Date Value Ref Range Status   02/09/2024 4.4 3.5 - 5.1 mmol/L Final     Carbon Dioxide   Date Value Ref Range Status   02/08/2024 28 22 - 29 mmol/L Final     Blood Urea Nitrogen   Date Value Ref Range Status   02/08/2024 12.7 7.0 - 18.7 mg/dL Final     Creatinine   Date Value Ref Range Status   02/08/2024 0.86 0.55 - 1.02 mg/dL Final     Calcium Level Total   Date Value Ref Range Status   02/08/2024 9.1 8.4 - 10.2 mg/dL Final     Albumin Level   Date Value Ref Range Status   02/08/2024 3.8 3.5 - 5.0 g/dL Final     Bilirubin Total   Date Value Ref Range Status   02/08/2024 0.2 <=1.5 mg/dL Final     Alkaline Phosphatase   Date Value Ref Range Status   02/08/2024 91 40 - 150 unit/L Final     Aspartate Aminotransferase   Date Value Ref Range Status   02/08/2024 19 5 - 34 unit/L Final     Alanine Aminotransferase   Date Value Ref Range Status   02/08/2024 13 0 - 55 unit/L Final     eGFR   Date Value Ref Range Status   02/08/2024 >60 mls/min/1.73/m2 Final     Lab Results   Component Value Date    TSH 1.036 08/17/2023     Hep C Ab Interp   Date Value Ref Range Status   08/17/2023 Nonreactive Nonreactive Final     Syphilis Antibody   Date Value Ref Range Status   08/17/2023 Nonreactive Nonreactive, Equivocal Final     Cholesterol Total   Date Value Ref Range Status   08/17/2023 155 <=200 mg/dL Final     HDL Cholesterol   Date Value Ref Range Status   08/17/2023 48 35 - 60 mg/dL Final     Triglyceride    Date Value Ref Range Status   08/17/2023 65 37 - 140 mg/dL Final     Cholesterol/HDL Ratio   Date Value Ref Range Status   08/17/2023 3 0 - 5 Final     Very Low Density Lipoprotein   Date Value Ref Range Status   08/17/2023 13  Final     LDL Cholesterol   Date Value Ref Range Status   08/17/2023 94.00 50.00 - 140.00 mg/dL Final     Vit D 25 OH   Date Value Ref Range Status   08/17/2023 17.7 (L) 30.0 - 80.0 ng/mL Final     Results for orders placed or performed in visit on 08/17/23   CD4 Lymphocytes   Result Value Ref Range    Patient Age 47     WBC Absolute 6,600 4,500 - 11,500 /mm3    Lymph Percent 39 28 - 48 %    Lymph Absolute 2,574 1,260 - 5,520 x10(3)/mcL    CD4 % 13.5 %    CD4 Absolute 347.49 (L) 589 - 1,505 unit/L    T Cell Interp       Normal absolute lymphocyte count with decreased absolute CD4+ lymphocyte count.    Rohit Navarro M.D.     Narrative    This test was developed and its performance characteristics determined by [facility]. It has not been cleared or approved by the US Food and Drug Administration. The FDA does not require this test to go through premarket FDA review. This test is used for clinical purposes. It should not be regarded as investigational or for research. This laboratory is certified under the Clinical Laboratory Improvement Amendments (CLIA) as qualified to perform high complexity clinical laboratory testing.     Results for orders placed or performed in visit on 02/08/24   HIV-1 RNA, Quantitative, PCR with Reflex to Genotype   Result Value Ref Range    HIV-1 RNA Detect/Quant, P 046265 (A) Undetected copies/mL     Results for orders placed or performed in visit on 08/17/23   Quantiferon Gold TB   Result Value Ref Range    QuantiFERON-Tb Gold Plus Result Negative Negative    TB1 Ag minus Nil Result 0.04 IU/mL    TB2 Ag minus Nil Result 0.02 IU/mL    Mitogen minus Nil Result 9.93 IU/mL    Nil Result 0.07 IU/mL     Results for orders placed or  performed in visit on 08/17/23   C.trach/N.gonor AMP RNA   Result Value Ref Range    Chlamydia trachomatis amplified RNA Negative Negative    Neisseria gonorrhoeae amplified RNA Negative Negative    Source THROAT     SOURCE: THROAT      Results for orders placed or performed in visit on 02/08/24   Urinalysis   Result Value Ref Range    Color, UA Light-Yellow Yellow, Light-Yellow, Dark Yellow, Mary, Straw    Appearance, UA Turbid (A) Clear    Specific Gravity, UA 1.013 1.005 - 1.030    pH, UA 5.5 5.0 - 8.5    Protein, UA Negative Negative    Glucose, UA Normal Negative, Normal    Ketones, UA Negative Negative    Blood, UA Negative Negative    Bilirubin, UA Negative Negative    Urobilinogen, UA Normal 0.2, 1.0, Normal    Nitrites, UA 2+ (A) Negative    Leukocyte Esterase,  (A) Negative    WBC, UA 51-99 (A) None Seen, 0-2, 3-5, 0-5 /HPF    Bacteria, UA Moderate (A) None Seen /HPF    Squamous Epithelial Cells, UA Moderate (A) None Seen /HPF    Hyaline Casts, UA None Seen None Seen /lpf    RBC, UA 0-5 None Seen, 0-2, 3-5, 0-5 /HPF       Imaging: Reviewed most recent relevant imaging studies available, notable results highlighted in this note    Medications:     Current Outpatient Medications   Medication Instructions    jmuoprixl-tfmddlrl-jcypsag ala (BIKTARVY) -25 mg (25 kg or greater) 1 tablet, Oral, Daily    busPIRone (BUSPAR) 7.5 mg, Oral, 2 times daily    cyanocobalamin (VITAMIN B-12) 100 mcg, Oral    divalproex ER (DEPAKOTE ER) 500 mg, Oral, 3 times daily    doravirine 100 mg, Oral, Daily, Take together with Biktarvy.    ergocalciferol (ERGOCALCIFEROL) 50,000 Units, Oral, Every 7 days    FLUoxetine 40 mg, Oral    naloxone (NARCAN) 4 mg/actuation Spry 4mg by nasal route as needed for opioid overdose; may repeat every 2-3 minutes in alternating nostrils until medical help arrives. Call 911    QUEtiapine (SEROQUEL) 600 mg, Oral, Nightly       Assessment:       Problem List Items Addressed This Visit     None  Visit Diagnoses       HIV disease    -  Primary    Relevant Orders    CD4 T-[place] Cells    CBC Auto Differential    Comprehensive Metabolic Panel    Urinalysis    HIV-1 RNA, Quantitative, PCR with Reflex to Genotype    Drug resistance to antiretroviral therapy        Relevant Orders    CD4 T-[place] Cells    CBC Auto Differential    Comprehensive Metabolic Panel    Urinalysis    HIV-1 RNA, Quantitative, PCR with Reflex to Genotype               Plan:      HIV disease  Drug resistance to antiretroviral therapy  -     CD4 T-[place] Cells; Future; Expected date: 2024  -     CBC Auto Differential; Future; Expected date: 2024  -     Comprehensive Metabolic Panel  -     Urinalysis  -     HIV-1 RNA, Quantitative, PCR with Reflex to Genotype; Future; Expected date: 2024  Diagnosed 2006.  Extensive adherence and sexual health counseling done.  Education provided for patient & her sister whom she is currently residing with.  Use condoms for all sexual encounters.  Blood precautions.  Continue Biktarvy 1 po daily & Pifletro 100 mg daily.   Labs today as ordered.  RTC 2 months with Amanda.      Wellness:  Cervical pap: ~2019 Dr. Hooker, 24 NIL  Anal pap: 2019 NIL  Cervical CT/GC:  Neg,  Neg  Anal CT/GC: 2019 Neg  Oral CT/GC: 2019 Neg,  Neg  Eye exam: 2019  MM24 BI-RADS 1  DEXA: N/A  Colon cancer screen: Cologuard not resulted 10/23. Declines repeat order at this time .    Vitamin D deficiency  Continue Ergocalciferol 79843 units weekly.

## 2024-03-26 ENCOUNTER — ANESTHESIA EVENT (OUTPATIENT)
Dept: SURGERY | Facility: HOSPITAL | Age: 49
End: 2024-03-26
Payer: MEDICAID

## 2024-03-26 ENCOUNTER — OFFICE VISIT (OUTPATIENT)
Dept: SURGERY | Facility: CLINIC | Age: 49
End: 2024-03-26
Payer: MEDICAID

## 2024-03-26 VITALS
SYSTOLIC BLOOD PRESSURE: 114 MMHG | DIASTOLIC BLOOD PRESSURE: 82 MMHG | BODY MASS INDEX: 31.92 KG/M2 | HEART RATE: 77 BPM | WEIGHT: 187 LBS | OXYGEN SATURATION: 100 % | HEIGHT: 64 IN | RESPIRATION RATE: 20 BRPM | TEMPERATURE: 99 F

## 2024-03-26 DIAGNOSIS — D17.20 LIPOMA OF UPPER EXTREMITY, UNSPECIFIED LATERALITY: ICD-10-CM

## 2024-03-26 LAB
CD3 CELLS # BLD: 2086 CELLS/MCL (ref 550–2202)
CD3 CELLS NFR BLD: 84 % (ref 58–86)
CD3+CD4+ CELLS # BLD: 342 CELLS/MCL (ref 365–1437)
CD3+CD4+ CELLS NFR BLD: 14 % (ref 32–64)
CD3+CD4+ CELLS/CD3+CD8+ CLL BLD: 0.2 %
CD3+CD8+ CELLS # BLD: 1717 CELLS/MCL (ref 145–846)
CD3+CD8+ CELLS NFR BLD: 69 % (ref 13–40)
CD45 CELLS # BLD: 2.48 THOU/MCL (ref 0.82–2.84)

## 2024-03-26 PROCEDURE — 99215 OFFICE O/P EST HI 40 MIN: CPT | Mod: PBBFAC

## 2024-03-26 RX ORDER — HEPARIN SODIUM 5000 [USP'U]/ML
5000 INJECTION, SOLUTION INTRAVENOUS; SUBCUTANEOUS
Status: CANCELLED | OUTPATIENT
Start: 2024-03-26 | End: 2024-03-26

## 2024-03-26 RX ORDER — SODIUM CHLORIDE 9 MG/ML
INJECTION, SOLUTION INTRAVENOUS CONTINUOUS
Status: CANCELLED | OUTPATIENT
Start: 2024-03-26

## 2024-03-26 NOTE — PROGRESS NOTES
Seen by Dr. Diamond.  Surgery 4/3/2024.  Surgery consent signed and witnessed.  Post op appt scheduled.  Discharge instructions verbal and written given.

## 2024-03-26 NOTE — PROGRESS NOTES
[facility] General Surgery Service  CONSULT / H&P NOTE  Date: 3/26/2024    CC:   Amy Hanson is a 48 y.o. female presenting with bilateral forearm soft tissue masses    HPI:   Amy Hanson is a 48 y.o. female with past medical history of HIV (CD4 347 VL 300k about 3 months ago, on Biktarvy), bipolar depression seizures presenting with bilateral forearm soft tissue masses. Patient has been noticing progressive pain, mainly in her right forearm with associated small mass. Patient's PCP ordered bilateral forearm US which showed bilateral soft tissues masses suggestive of lipoma. Patient is here today in clinic for surgical evaluation as she would like to have them removed. Denies cardiac history, previous extremity surgery. Patient does not take any blood thinning medication but does vape daily.     PMH:   Past Medical History:   Diagnosis Date    Anemia     Bipolar disorder     Depression     HIV infection     Mitral valve prolapse     Seizures        PSH:   Past Surgical History:   Procedure Laterality Date    FACIAL FRACTURE SURGERY      MOUTH SURGERY  03/08/2024    TUBAL LIGATION         FamHx:   Family History   Problem Relation Age of Onset    Heart disease Mother     Heart attack Mother     Hypertension Father     COPD Sister     Intellectual disability Sister        SocHx:  Social History     Socioeconomic History    Marital status:    Tobacco Use    Smoking status: Every Day     Types: Vaping w/o nicotine    Smokeless tobacco: Never   Substance and Sexual Activity    Alcohol use: Never    Drug use: Yes     Types: Marijuana     Comment: Daily    Sexual activity: Not Currently     Partners: Male     Birth control/protection: Condom       Allergies:   Review of patient's allergies indicates:  No Known Allergies    Medications:  Current Outpatient Medications on File Prior to Visit   Medication Sig Dispense Refill    vdiqtibtf-ybinoytr-despzkm ala (BIKTARVY) -25 mg (25 kg or greater) Take 1 tablet  "by mouth once daily. 30 tablet 3    cyanocobalamin (VITAMIN B-12) 100 MCG tablet Take 100 mcg by mouth.      divalproex ER (DEPAKOTE) 500 MG Tb24 Take 500 mg by mouth 3 (three) times daily.      doravirine 100 mg Tab Take 1 tablet (100 mg total) by mouth once daily. Take together with Biktarvy. 30 tablet 3    ergocalciferol (ERGOCALCIFEROL) 50,000 unit Cap Take 1 capsule (50,000 Units total) by mouth every 7 days. 5 capsule 3    FLUoxetine 40 MG capsule Take 40 mg by mouth.      QUEtiapine (SEROQUEL) 300 MG Tab Take 600 mg by mouth nightly.      busPIRone (BUSPAR) 7.5 MG tablet Take 7.5 mg by mouth 2 (two) times daily.      naloxone (NARCAN) 4 mg/actuation Spry 4mg by nasal route as needed for opioid overdose; may repeat every 2-3 minutes in alternating nostrils until medical help arrives. Call 911 (Patient not taking: Reported on 3/25/2024) 1 each 1     No current facility-administered medications on file prior to visit.         ROS:   Gen: Denies any weight change, fatigue, fever, or chills  Skin: No new rashes or other skin changes  Cardiac: Denies any orthopnea or palpitations  Resp: Denies any cough, wheezing, or shortness of breath  Neuro: No pain or tingling in extremities.  No new onset weakness.      Objective:    VITAL SIGNS: 24 HR MIN & MAX LAST    @FLOWSTAT(6:24::1)@  98.6 °F (37 °C)        @FLOWSTAT(5:24::1)@  114/82     @FLOWSTAT(8:24::1)@  77     @FLOWSTAT(9:24::1)@  20    @FLOWSTAT(10:24::1)@  100 %      HT: 5' 3.78" (162 cm)  WT: 84.8 kg (187 lb)  BMI: 32.3       Physical Exam:  GENERAL: NAD, resting comfortably in stretcher  NEURO: awake, alert, oriented x3  CARDIO: regular rate  CHEST: Non-labored breathing  ABD: S/NT/ND, no rebound tenderness or guarding  /RECTAL: rectal exam deferred  EXT: bilateral 1-2 cm mobile soft tissue masses, 2+ distal pulses bilaterally, right mass TTP   SKIN: no rashes, no lesions    Results  None new    Imaging:  FINDINGS:  In the right posterior forearm, there is a " subcutaneous wider than tall 17 x 6 x 14 mm isoechoic circumscribed nodule without significant Doppler flow.  Multiple similar appearing subcutaneous nodules on the left measure up to 16 x 7 x 16 mm.  No abnormal fluid collections.     Impression:     Findings most compatible with bilateral forearm lipomas    A/P:   48 y.o. female with past medical history of HIV (CD4 347 VL 300k about 3 months ago, on Biktarvy), bipolar depression seizures presenting with bilateral forearm lipomas found on US. Right arm lipoma causes more pain than the left. No cardiac history, able to ambulate without shortness of breath. Patient is a smoker (vapes).      Will follow up results on new CD4 and viral load to make sure they are still improving  Plan for excision of soft tissue masses on bilateral forearms on 4/3    Ten Diamond MD MPH  [employer] General Surgery PGY1  03/26/2024

## 2024-03-26 NOTE — ANESTHESIA PREPROCEDURE EVALUATION
Amy Hanson is a 48 y.o. female PRESENTING FOR EXCISION, SOFT TISSUE, Bilateral forearms (Bilateral) with a history of   -OSMIN ARM LIPOMAS  -HIV (DANK VL)  -BIPOLAR D/O/ANXIETY  -SEIZURES (NO RECENT ISSUES)  -HLD  -CHRONIC NECK PAIN  -PSA (HEROIN AND METH; WENT INTO REHAB 8/2023)/MARIJUANA USE    -OD ON HEROIN 7/2/23  -H/O T2DM (A1C 5.1 8/17/23)  -VAPES  -BMI 32    BETA-BLOCKER: NONE    New Orders for Anesthesia: UPT, CBG    Patient Active Problem List   Diagnosis    Anxiety    Bipolar I disorder with depression    Depressive disorder    Seizure    Hyperlipidemia    HIV infection    Back pain    HPV (human papilloma virus) anogenital infection    Chronic neck pain     Pre-op Assessment    I have reviewed the NPO Status.      Review of Systems  Anesthesia Hx:  No problems with previous Anesthesia                Social:  Recreational Drugs, Vaping       Cardiovascular:                hyperlipidemia                             Pulmonary:  Pulmonary Normal                       Renal/:  Renal/ Normal                 Hepatic/GI:  Hepatic/GI Normal                 Neurological:       Seizures, well controlled          Chronic Pain Syndrome                         Endocrine:  Diabetes, well controlled, type 2         Obesity / BMI > 30  Psych:  Psychiatric History anxiety               Vitals:    04/03/24 0847 04/03/24 0856 04/03/24 0934 04/03/24 1118   BP:  110/75 110/75    Pulse:  78     Temp:  36.5 °C (97.7 °F)  36.3 °C (97.3 °F)   TempSrc:  Oral     SpO2:  98%     Weight: 82.4 kg (181 lb 9.6 oz)            Physical Exam  General: Alert, Cooperative and Well nourished    Airway:  Mallampati: II   Mouth Opening: Normal  TM Distance: Normal  Tongue: Normal  Neck ROM: Normal ROM    Dental:  Dentures    Chest/Lungs:  Clear to auscultation, Normal Respiratory Rate    Heart:  Rate: Normal  Rhythm: Regular Rhythm  Sounds: Normal       Latest Reference Range & Units 04/03/24 08:58   POCT Glucose 70 - 110 mg/dL 80       Latest Reference Range & Units 04/03/24 09:13   hCG Qualitative, Urine Negative  Negative     Lab Results   Component Value Date    WBC 6.28 03/25/2024    HGB 12.9 03/25/2024    HCT 38.8 03/25/2024    MCV 90.9 03/25/2024     03/25/2024       CMP  Sodium Level   Date Value Ref Range Status   03/25/2024 136 136 - 145 mmol/L Final     Potassium Level   Date Value Ref Range Status   03/25/2024 3.9 3.5 - 5.1 mmol/L Final     Carbon Dioxide   Date Value Ref Range Status   03/25/2024 24 22 - 29 mmol/L Final     Blood Urea Nitrogen   Date Value Ref Range Status   03/25/2024 6.4 (L) 7.0 - 18.7 mg/dL Final     Creatinine   Date Value Ref Range Status   03/25/2024 0.82 0.55 - 1.02 mg/dL Final     Calcium Level Total   Date Value Ref Range Status   03/25/2024 9.5 8.4 - 10.2 mg/dL Final     Albumin Level   Date Value Ref Range Status   03/25/2024 3.8 3.5 - 5.0 g/dL Final     Bilirubin Total   Date Value Ref Range Status   03/25/2024 0.4 <=1.5 mg/dL Final     Alkaline Phosphatase   Date Value Ref Range Status   03/25/2024 70 40 - 150 unit/L Final     Aspartate Aminotransferase   Date Value Ref Range Status   03/25/2024 15 5 - 34 unit/L Final     Alanine Aminotransferase   Date Value Ref Range Status   03/25/2024 8 0 - 55 unit/L Final     eGFR   Date Value Ref Range Status   03/25/2024 >60 mls/min/1.73/m2 Final     EKG 12/23/23        Anesthesia Plan  Type of Anesthesia, risks & benefits discussed:    Anesthesia Type: Gen Supraglottic Airway  Intra-op Monitoring Plan: Standard ASA Monitors  Post Op Pain Control Plan: IV/PO Opioids PRN  Induction:  IV  Airway Plan: Direct  Informed Consent: Informed consent signed with the Patient and all parties understand the risks and agree with anesthesia plan.  All questions answered.   ASA Score: 3  Day of Surgery Review of History & Physical: H&P Update referred to the surgeon/provider.    Ready For Surgery From Anesthesia Perspective.     .

## 2024-03-27 DIAGNOSIS — N39.0 URINARY TRACT INFECTION WITHOUT HEMATURIA, SITE UNSPECIFIED: Primary | ICD-10-CM

## 2024-03-27 LAB
BACTERIA UR CULT: ABNORMAL
HIV1 RNA # PLAS NAA DL=20: 42 COPIES/ML

## 2024-03-27 RX ORDER — CIPROFLOXACIN 500 MG/1
500 TABLET ORAL EVERY 12 HOURS
Qty: 14 TABLET | Refills: 0 | Status: ON HOLD | OUTPATIENT
Start: 2024-03-27 | End: 2024-04-03 | Stop reason: HOSPADM

## 2024-03-27 NOTE — PROGRESS NOTES
Urine culture is showing Klebisella pneumoniae species. Pt was previously treated with Cipro last month. She will need to be retreated as bacteria has not resolved. She is sensitive to Cipro and it has better sensitivity to profile than Bactrim or Augmentin.  I will send rx. Please notify the patient of findings and treatment plan.

## 2024-03-28 ENCOUNTER — TELEPHONE (OUTPATIENT)
Dept: INFECTIOUS DISEASES | Facility: CLINIC | Age: 49
End: 2024-03-28
Payer: MEDICAID

## 2024-03-28 NOTE — TELEPHONE ENCOUNTER
Phoned patient. Discussed Urine culture is showing Klebisella pneumoniae species. Pt was previously treated with Cipro last month. Will need to be retreated as bacteria has not resolved. Sensitive to Cipro and but it has better sensitivity to profile than Bactrim or Augmentin. Rx sent to pharmacy. Voiced understanding and appreciates call.

## 2024-03-28 NOTE — TELEPHONE ENCOUNTER
----- Message from CECILIA Carter sent at 3/27/2024  4:06 PM CDT -----  Urine culture is showing Klebisella pneumoniae species. Pt was previously treated with Cipro last month. She will need to be retreated as bacteria has not resolved. She is sensitive to Cipro and it has better sensitivity to profile than Bactrim or Augmentin.  I will send rx. Please notify the patient of findings and treatment plan.

## 2024-04-03 ENCOUNTER — ANESTHESIA (OUTPATIENT)
Dept: SURGERY | Facility: HOSPITAL | Age: 49
End: 2024-04-03
Payer: MEDICAID

## 2024-04-03 ENCOUNTER — HOSPITAL ENCOUNTER (OUTPATIENT)
Facility: HOSPITAL | Age: 49
Discharge: HOME | End: 2024-04-03
Attending: SURGERY | Admitting: SURGERY
Payer: MEDICAID

## 2024-04-03 DIAGNOSIS — D17.20 LIPOMA OF UPPER EXTREMITY, UNSPECIFIED LATERALITY: ICD-10-CM

## 2024-04-03 LAB
B-HCG UR QL: NEGATIVE
CTP QC/QA: YES
POCT GLUCOSE: 80 MG/DL (ref 70–110)

## 2024-04-03 PROCEDURE — 25000003 PHARM REV CODE 250: Performed by: NURSE ANESTHETIST, CERTIFIED REGISTERED

## 2024-04-03 PROCEDURE — 63600175 PHARM REV CODE 636 W HCPCS

## 2024-04-03 PROCEDURE — 88304 TISSUE EXAM BY PATHOLOGIST: CPT | Mod: TC | Performed by: SURGERY

## 2024-04-03 PROCEDURE — 71000033 HC RECOVERY, INTIAL HOUR: Performed by: SURGERY

## 2024-04-03 PROCEDURE — 81025 URINE PREGNANCY TEST: CPT | Performed by: NURSE PRACTITIONER

## 2024-04-03 PROCEDURE — 82962 GLUCOSE BLOOD TEST: CPT | Performed by: SURGERY

## 2024-04-03 PROCEDURE — 36000707: Performed by: SURGERY

## 2024-04-03 PROCEDURE — 37000008 HC ANESTHESIA 1ST 15 MINUTES: Performed by: SURGERY

## 2024-04-03 PROCEDURE — 37000009 HC ANESTHESIA EA ADD 15 MINS: Performed by: SURGERY

## 2024-04-03 PROCEDURE — 63600175 PHARM REV CODE 636 W HCPCS: Performed by: NURSE ANESTHETIST, CERTIFIED REGISTERED

## 2024-04-03 PROCEDURE — 25000003 PHARM REV CODE 250: Performed by: SURGERY

## 2024-04-03 PROCEDURE — 71000016 HC POSTOP RECOV ADDL HR: Performed by: SURGERY

## 2024-04-03 PROCEDURE — D9220A PRA ANESTHESIA: Mod: CRNA,,, | Performed by: NURSE ANESTHETIST, CERTIFIED REGISTERED

## 2024-04-03 PROCEDURE — D9220A PRA ANESTHESIA: Mod: ANES,,, | Performed by: ANESTHESIOLOGY

## 2024-04-03 PROCEDURE — 71000015 HC POSTOP RECOV 1ST HR: Performed by: SURGERY

## 2024-04-03 PROCEDURE — 36000706: Performed by: SURGERY

## 2024-04-03 PROCEDURE — 63600175 PHARM REV CODE 636 W HCPCS: Performed by: ANESTHESIOLOGY

## 2024-04-03 RX ORDER — ONDANSETRON HYDROCHLORIDE 2 MG/ML
INJECTION, SOLUTION INTRAMUSCULAR; INTRAVENOUS
Status: DISCONTINUED | OUTPATIENT
Start: 2024-04-03 | End: 2024-04-03

## 2024-04-03 RX ORDER — SODIUM CHLORIDE, SODIUM LACTATE, POTASSIUM CHLORIDE, CALCIUM CHLORIDE 600; 310; 30; 20 MG/100ML; MG/100ML; MG/100ML; MG/100ML
INJECTION, SOLUTION INTRAVENOUS CONTINUOUS
Status: ACTIVE | OUTPATIENT
Start: 2024-04-03

## 2024-04-03 RX ORDER — OXYCODONE HYDROCHLORIDE 5 MG/1
5 TABLET ORAL
Status: DISCONTINUED | OUTPATIENT
Start: 2024-04-03 | End: 2024-04-03 | Stop reason: HOSPADM

## 2024-04-03 RX ORDER — SODIUM CHLORIDE 0.9 % (FLUSH) 0.9 %
10 SYRINGE (ML) INJECTION
Status: DISCONTINUED | OUTPATIENT
Start: 2024-04-03 | End: 2024-04-03 | Stop reason: HOSPADM

## 2024-04-03 RX ORDER — LIDOCAINE HYDROCHLORIDE 10 MG/ML
INJECTION INFILTRATION; PERINEURAL
Status: DISCONTINUED | OUTPATIENT
Start: 2024-04-03 | End: 2024-04-03 | Stop reason: HOSPADM

## 2024-04-03 RX ORDER — AMOXICILLIN 250 MG
1 CAPSULE ORAL DAILY
Qty: 30 TABLET | Refills: 0 | Status: SHIPPED | OUTPATIENT
Start: 2024-04-03

## 2024-04-03 RX ORDER — SODIUM CHLORIDE 9 MG/ML
INJECTION, SOLUTION INTRAVENOUS CONTINUOUS
Status: DISCONTINUED | OUTPATIENT
Start: 2024-04-03 | End: 2024-04-03 | Stop reason: HOSPADM

## 2024-04-03 RX ORDER — LIDOCAINE HYDROCHLORIDE 20 MG/ML
INJECTION, SOLUTION EPIDURAL; INFILTRATION; INTRACAUDAL; PERINEURAL
Status: DISCONTINUED | OUTPATIENT
Start: 2024-04-03 | End: 2024-04-03

## 2024-04-03 RX ORDER — PROPOFOL 10 MG/ML
VIAL (ML) INTRAVENOUS
Status: DISCONTINUED | OUTPATIENT
Start: 2024-04-03 | End: 2024-04-03

## 2024-04-03 RX ORDER — DEXAMETHASONE SODIUM PHOSPHATE 4 MG/ML
INJECTION, SOLUTION INTRA-ARTICULAR; INTRALESIONAL; INTRAMUSCULAR; INTRAVENOUS; SOFT TISSUE
Status: DISCONTINUED | OUTPATIENT
Start: 2024-04-03 | End: 2024-04-03

## 2024-04-03 RX ORDER — KETOROLAC TROMETHAMINE 30 MG/ML
INJECTION, SOLUTION INTRAMUSCULAR; INTRAVENOUS
Status: DISCONTINUED | OUTPATIENT
Start: 2024-04-03 | End: 2024-04-03

## 2024-04-03 RX ORDER — MIDAZOLAM HYDROCHLORIDE 2 MG/2ML
2 INJECTION, SOLUTION INTRAMUSCULAR; INTRAVENOUS ONCE AS NEEDED
Status: COMPLETED | OUTPATIENT
Start: 2024-04-03 | End: 2024-04-03

## 2024-04-03 RX ORDER — HYDROCODONE BITARTRATE AND ACETAMINOPHEN 5; 325 MG/1; MG/1
1 TABLET ORAL EVERY 6 HOURS PRN
Qty: 11 TABLET | Refills: 0 | Status: SHIPPED | OUTPATIENT
Start: 2024-04-03

## 2024-04-03 RX ORDER — HEPARIN SODIUM 5000 [USP'U]/ML
5000 INJECTION, SOLUTION INTRAVENOUS; SUBCUTANEOUS
Status: COMPLETED | OUTPATIENT
Start: 2024-04-03 | End: 2024-04-03

## 2024-04-03 RX ORDER — ONDANSETRON HYDROCHLORIDE 2 MG/ML
4 INJECTION, SOLUTION INTRAVENOUS DAILY PRN
Status: DISCONTINUED | OUTPATIENT
Start: 2024-04-03 | End: 2024-04-03 | Stop reason: HOSPADM

## 2024-04-03 RX ORDER — CEFAZOLIN SODIUM 1 G/3ML
2 INJECTION, POWDER, FOR SOLUTION INTRAMUSCULAR; INTRAVENOUS
Status: COMPLETED | OUTPATIENT
Start: 2024-04-03 | End: 2024-04-03

## 2024-04-03 RX ORDER — MEPERIDINE HYDROCHLORIDE 25 MG/ML
12.5 INJECTION INTRAMUSCULAR; INTRAVENOUS; SUBCUTANEOUS EVERY 10 MIN PRN
Status: DISCONTINUED | OUTPATIENT
Start: 2024-04-03 | End: 2024-04-03 | Stop reason: HOSPADM

## 2024-04-03 RX ORDER — HYDROMORPHONE HYDROCHLORIDE 1 MG/ML
0.2 INJECTION, SOLUTION INTRAMUSCULAR; INTRAVENOUS; SUBCUTANEOUS EVERY 5 MIN PRN
Status: DISCONTINUED | OUTPATIENT
Start: 2024-04-03 | End: 2024-04-03 | Stop reason: HOSPADM

## 2024-04-03 RX ADMIN — DEXAMETHASONE SODIUM PHOSPHATE 8 MG: 4 INJECTION, SOLUTION INTRA-ARTICULAR; INTRALESIONAL; INTRAMUSCULAR; INTRAVENOUS; SOFT TISSUE at 12:04

## 2024-04-03 RX ADMIN — HEPARIN SODIUM 5000 UNITS: 5000 INJECTION, SOLUTION INTRAVENOUS; SUBCUTANEOUS at 09:04

## 2024-04-03 RX ADMIN — MIDAZOLAM HYDROCHLORIDE 2 MG: 1 INJECTION, SOLUTION INTRAMUSCULAR; INTRAVENOUS at 11:04

## 2024-04-03 RX ADMIN — PROPOFOL 200 MG: 10 INJECTION, EMULSION INTRAVENOUS at 12:04

## 2024-04-03 RX ADMIN — LIDOCAINE HYDROCHLORIDE 50 MG: 20 INJECTION, SOLUTION EPIDURAL; INFILTRATION; INTRACAUDAL; PERINEURAL at 12:04

## 2024-04-03 RX ADMIN — SODIUM CHLORIDE, POTASSIUM CHLORIDE, SODIUM LACTATE AND CALCIUM CHLORIDE: 600; 310; 30; 20 INJECTION, SOLUTION INTRAVENOUS at 11:04

## 2024-04-03 RX ADMIN — ONDANSETRON 4 MG: 2 INJECTION INTRAMUSCULAR; INTRAVENOUS at 12:04

## 2024-04-03 RX ADMIN — KETOROLAC TROMETHAMINE 30 MG: 30 INJECTION, SOLUTION INTRAMUSCULAR at 12:04

## 2024-04-03 RX ADMIN — CEFAZOLIN 2 G: 330 INJECTION, POWDER, FOR SOLUTION INTRAMUSCULAR; INTRAVENOUS at 12:04

## 2024-04-03 NOTE — PLAN OF CARE
Problem: Adult Inpatient Plan of Care  Goal: Plan of Care Review  4/3/2024 1449 by David Gotti RN  Outcome: Met  4/3/2024 1448 by David Gotti RN  Outcome: Ongoing, Progressing  4/3/2024 1445 by David Gotti RN  Outcome: Ongoing, Progressing  Goal: Patient-Specific Goal (Individualized)  4/3/2024 1449 by David Gotti RN  Outcome: Met  4/3/2024 1448 by David Gotti RN  Outcome: Ongoing, Progressing  4/3/2024 1445 by David Gotti RN  Outcome: Ongoing, Progressing  Goal: Absence of Hospital-Acquired Illness or Injury  4/3/2024 1449 by David Gotti RN  Outcome: Met  4/3/2024 1448 by David Gotti RN  Outcome: Ongoing, Progressing  4/3/2024 1445 by David Gotti RN  Outcome: Ongoing, Progressing  Goal: Optimal Comfort and Wellbeing  4/3/2024 1449 by David Gotti RN  Outcome: Met  4/3/2024 1448 by David Gotti RN  Outcome: Ongoing, Progressing  4/3/2024 1445 by David Gotti RN  Outcome: Ongoing, Progressing  Goal: Readiness for Transition of Care  4/3/2024 1449 by David Gotti RN  Outcome: Met  4/3/2024 1448 by David Gotti RN  Outcome: Ongoing, Progressing  4/3/2024 1445 by David Gotti RN  Outcome: Ongoing, Progressing

## 2024-04-03 NOTE — OP NOTE
EXCISION, SOFT TISSUE, Bilateral forearms Op Note    Date: 4/3/2024    Surgeon:  Surgeon(s) and Role:     * Deepak Arevalo Jr., MD - Primary    Assistant: Dr. Ifearulundu Dr. Scheuermann    Anesthesia: Local MAC    Procedure performed:   Excision of soft tissue masses of bilateral forearms    Pre-operative Diagnosis:   soft tissue masses of bilateral forearms    Post-operative Diagnosis:   soft tissue masses of bilateral forearms      Indication for Procedure:  soft tissue masses of bilateral forearms    Findings:  Fatty soft tissue masses x2           Procedure Details   Once consent was obtain and surgical location verified, patient was brought to the operating room and positioned in the supine position with adequate anesthesia and intubation then surgical time out was performed. Preoperative antibiotics were administered prior to surgery. Patient's bilateral forearms was prepped and draped in the usual surgical sterile fashion. A 2 cm incision was made using a #10 blade over the soft tissues masses down to the subcutaneous tissue. A 2x2 cm subcutaneous mass was blunted dissected out with forceps of both forearms. Hemostasis was achieved and the wound was copiously irrigated with sterile solution. 7 cc of lidocaine was injected into each forearm. Incision was closed with deep dermal interrupted vicryl and subcuticular running Monocryl. Derma bond skin glue dressing was applied.     Patient tolerated the procedure well, all counts were correct at the conclusion of the procedure . The patient was stable, extubated and transferred to PACU.        Estimated Blood Loss:  5 cc      Drains: none           Specimens:   ID Type Source Tests Collected by Time Destination   A : left upper extremity mass Tissue Forearm, Left SPECIMEN TO PATHOLOGY Deepak Arevalo Jr., MD 4/3/2024 1220    B : right upper extremity mass Tissue Forearm, Right SPECIMEN TO PATHOLOGY Deepak Arevalo Jr., MD 4/3/2024 1221                 Implants:  * No implants in log *     Complications:  None           Disposition: PACU - hemodynamically stable.           Condition: stable        Ten Diamond MD 4/3/2024 12:36 PM

## 2024-04-03 NOTE — ANESTHESIA PROCEDURE NOTES
Intubation    Date/Time: 4/3/2024 12:03 PM    Performed by: Juan David Coley CRNA  Authorized by: Isha Martinez MD    Intubation:     Induction:  Intravenous    Intubated:  Postinduction    Mask Ventilation:  Easy mask    Attempts:  1    Attempted By:  Student    Difficult Airway Encountered?: No      Complications:  None    Airway Device:  Supraglottic airway/LMA    Airway Device Size:  3.0    Placement Verified By:  Capnometry    Complicating Factors:  None    Findings Post-Intubation:  BS equal bilateral

## 2024-04-03 NOTE — ANESTHESIA POSTPROCEDURE EVALUATION
Anesthesia Post Evaluation    Patient: Amy Hanson    Procedure(s) Performed: Procedure(s) (LRB):  EXCISION, SOFT TISSUE, Bilateral forearms (Bilateral)    Final Anesthesia Type: general      Patient location during evaluation: DOSC  Post-procedure vital signs: reviewed and stable  Airway patency: patent      Anesthetic complications: no      Cardiovascular status: hemodynamically stable  Respiratory status: spontaneous ventilation  Follow-up not needed.              Vitals Value Taken Time   /84 04/03/24 1349   Temp 36.4 °C (97.5 °F) 04/03/24 1300   Pulse 85 04/03/24 1351   Resp 19 04/03/24 1315   SpO2 99 % 04/03/24 1351   Vitals shown include unvalidated device data.      Event Time   Out of Recovery 12:58:00         Pain/Chaz Score: Chaz Score: 10 (4/3/2024 12:55 PM)

## 2024-04-03 NOTE — PLAN OF CARE
Problem: Adult Inpatient Plan of Care  Goal: Plan of Care Review  4/3/2024 1448 by David Gotti RN  Outcome: Ongoing, Progressing  4/3/2024 1445 by David Gotti RN  Outcome: Ongoing, Progressing  Goal: Patient-Specific Goal (Individualized)  4/3/2024 1448 by David Gotti RN  Outcome: Ongoing, Progressing  4/3/2024 1445 by David Gotti RN  Outcome: Ongoing, Progressing  Goal: Absence of Hospital-Acquired Illness or Injury  4/3/2024 1448 by David Gotti RN  Outcome: Ongoing, Progressing  4/3/2024 1445 by David Gotti RN  Outcome: Ongoing, Progressing  Goal: Optimal Comfort and Wellbeing  4/3/2024 1448 by David Gotti RN  Outcome: Ongoing, Progressing  4/3/2024 1445 by David Gotti RN  Outcome: Ongoing, Progressing  Goal: Readiness for Transition of Care  4/3/2024 1448 by David Gotti RN  Outcome: Ongoing, Progressing  4/3/2024 1445 by David Gotti RN  Outcome: Ongoing, Progressing

## 2024-04-03 NOTE — H&P
H&P Update    Patient seen and examined this morning. There are no changes to the documented H&P.    Patient has held home blood thinners for appropriate amount of time: N/A    Planned procedure: EXCISION, SOFT TISSUE, Bilateral forearms    Positioning: Supine    Pre-operative Heparin Ordered: Yes    Pre-operative Antibiotics Ordered: Yes: Ancef    Laterality Marked: No bilateral     Ten Diamond MD  9:47 AM  04/03/2024

## 2024-04-03 NOTE — TRANSFER OF CARE
Anesthesia Transfer of Care Note    Patient: Amy Hanson    Procedure(s) Performed: Procedure(s) (LRB):  EXCISION, SOFT TISSUE, Bilateral forearms (Bilateral)    Patient location: PACU    Anesthesia Type: general    Transport from OR: Transported from OR on room air with adequate spontaneous ventilation    Post assessment: no apparent anesthetic complications    Post vital signs: stable    Level of consciousness: awake    Nausea/Vomiting: no nausea/vomiting    Complications: none    Transfer of care protocol was followed      Last vitals: Visit Vitals  /72   Pulse 72   Temp 36.3 °C (97.3 °F)   Resp 20   Wt 82.4 kg (181 lb 9.6 oz)   LMP 03/01/2024   SpO2 100%   Breastfeeding No   BMI 31.39 kg/m²

## 2024-04-04 VITALS
WEIGHT: 181.63 LBS | RESPIRATION RATE: 20 BRPM | SYSTOLIC BLOOD PRESSURE: 154 MMHG | HEART RATE: 71 BPM | DIASTOLIC BLOOD PRESSURE: 84 MMHG | BODY MASS INDEX: 31.39 KG/M2 | OXYGEN SATURATION: 94 % | TEMPERATURE: 98 F

## 2024-04-04 LAB
ESTROGEN SERPL-MCNC: NORMAL PG/ML
INSULIN SERPL-ACNC: NORMAL U[IU]/ML
LAB AP CLINICAL INFORMATION: NORMAL
LAB AP GROSS DESCRIPTION: NORMAL
LAB AP REPORT FOOTNOTES: NORMAL
T3RU NFR SERPL: NORMAL %

## 2024-06-17 ENCOUNTER — TELEPHONE (OUTPATIENT)
Dept: INFECTIOUS DISEASES | Facility: CLINIC | Age: 49
End: 2024-06-17
Payer: MEDICAID

## 2024-06-17 DIAGNOSIS — B20 HIV DISEASE: ICD-10-CM

## 2024-06-17 RX ORDER — ERGOCALCIFEROL 1.25 MG/1
50000 CAPSULE ORAL
Qty: 5 CAPSULE | Refills: 3 | Status: SHIPPED | OUTPATIENT
Start: 2024-06-17

## 2024-06-17 NOTE — TELEPHONE ENCOUNTER
Last visit with Amanda Leon, APRN: 3/25/2024  Last visit in [facility] INFECTIOUS DISEASE: 3/25/2024    Patient's next visit in [facility] INFECTIOUS DISEASE: 6/20/2024     LL 03/25/2024    Please advise on medication refill

## 2024-07-01 ENCOUNTER — DOCUMENTATION ONLY (OUTPATIENT)
Dept: INFECTIOUS DISEASES | Facility: CLINIC | Age: 49
End: 2024-07-01
Payer: MEDICAID

## 2024-07-15 ENCOUNTER — TELEPHONE (OUTPATIENT)
Dept: INFECTIOUS DISEASES | Facility: CLINIC | Age: 49
End: 2024-07-15
Payer: MEDICAID

## 2024-07-15 DIAGNOSIS — B20 HIV DISEASE: ICD-10-CM

## 2024-07-15 RX ORDER — BICTEGRAVIR SODIUM, EMTRICITABINE, AND TENOFOVIR ALAFENAMIDE FUMARATE 50; 200; 25 MG/1; MG/1; MG/1
1 TABLET ORAL DAILY
Qty: 30 TABLET | Refills: 1 | Status: SHIPPED | OUTPATIENT
Start: 2024-07-15

## 2024-07-15 NOTE — TELEPHONE ENCOUNTER
Last visit with Amanda Leon, APRN: 3/25/2024  Last visit in [facility] INFECTIOUS DISEASE: 3/25/2024    Patient's next visit in [facility] INFECTIOUS DISEASE: To be scheduled    LL 03/25/2024    Please advise on medication refill

## 2024-09-05 DIAGNOSIS — Z16.33 DRUG RESISTANCE TO ANTIRETROVIRAL THERAPY: ICD-10-CM

## 2024-09-05 DIAGNOSIS — B20 HIV DISEASE: ICD-10-CM

## 2024-09-18 ENCOUNTER — OFFICE VISIT (OUTPATIENT)
Dept: INFECTIOUS DISEASES | Facility: CLINIC | Age: 49
End: 2024-09-18
Payer: MEDICAID

## 2024-09-18 ENCOUNTER — LAB VISIT (OUTPATIENT)
Dept: LAB | Facility: HOSPITAL | Age: 49
End: 2024-09-18
Attending: NURSE PRACTITIONER
Payer: MEDICAID

## 2024-09-18 VITALS
HEIGHT: 63 IN | SYSTOLIC BLOOD PRESSURE: 133 MMHG | HEART RATE: 94 BPM | RESPIRATION RATE: 12 BRPM | DIASTOLIC BLOOD PRESSURE: 86 MMHG | WEIGHT: 168.75 LBS | TEMPERATURE: 98 F | BODY MASS INDEX: 29.9 KG/M2

## 2024-09-18 DIAGNOSIS — Z23 NEED FOR VACCINATION: ICD-10-CM

## 2024-09-18 DIAGNOSIS — Z16.33 DRUG RESISTANCE TO ANTIRETROVIRAL THERAPY: ICD-10-CM

## 2024-09-18 DIAGNOSIS — B20 HIV DISEASE: ICD-10-CM

## 2024-09-18 DIAGNOSIS — B20 HIV DISEASE: Primary | ICD-10-CM

## 2024-09-18 LAB
25(OH)D3+25(OH)D2 SERPL-MCNC: 25 NG/ML (ref 30–80)
ALBUMIN SERPL-MCNC: 4 G/DL (ref 3.5–5)
ALBUMIN/GLOB SERPL: 1.1 RATIO (ref 1.1–2)
ALP SERPL-CCNC: 88 UNIT/L (ref 40–150)
ALT SERPL-CCNC: 10 UNIT/L (ref 0–55)
ANION GAP SERPL CALC-SCNC: 9 MEQ/L
AST SERPL-CCNC: 14 UNIT/L (ref 5–34)
BACTERIA #/AREA URNS AUTO: ABNORMAL /HPF
BASOPHILS # BLD AUTO: 0.04 X10(3)/MCL
BASOPHILS NFR BLD AUTO: 0.5 %
BILIRUB SERPL-MCNC: 0.4 MG/DL
BILIRUB UR QL STRIP.AUTO: NEGATIVE
BUN SERPL-MCNC: 5.1 MG/DL (ref 7–18.7)
C TRACH DNA SPEC QL NAA+PROBE: NOT DETECTED
CALCIUM SERPL-MCNC: 10.1 MG/DL (ref 8.4–10.2)
CHLORIDE SERPL-SCNC: 105 MMOL/L (ref 98–107)
CHOLEST SERPL-MCNC: 190 MG/DL
CHOLEST/HDLC SERPL: 3 {RATIO} (ref 0–5)
CLARITY UR: CLEAR
CO2 SERPL-SCNC: 26 MMOL/L (ref 22–29)
COLOR UR AUTO: ABNORMAL
CREAT SERPL-MCNC: 0.79 MG/DL (ref 0.55–1.02)
CREAT/UREA NIT SERPL: 6
EOSINOPHIL # BLD AUTO: 0.02 X10(3)/MCL (ref 0–0.9)
EOSINOPHIL NFR BLD AUTO: 0.3 %
ERYTHROCYTE [DISTWIDTH] IN BLOOD BY AUTOMATED COUNT: 13.9 % (ref 11.5–17)
EST. AVERAGE GLUCOSE BLD GHB EST-MCNC: 99.7 MG/DL
GFR SERPLBLD CREATININE-BSD FMLA CKD-EPI: >60 ML/MIN/1.73/M2
GLOBULIN SER-MCNC: 3.6 GM/DL (ref 2.4–3.5)
GLUCOSE SERPL-MCNC: 97 MG/DL (ref 74–100)
GLUCOSE UR QL STRIP: NORMAL
HAV AB SER QL IA: NONREACTIVE
HBA1C MFR BLD: 5.1 %
HBV SURFACE AB SER-ACNC: 92.75 MIU/ML
HBV SURFACE AB SERPL IA-ACNC: REACTIVE M[IU]/ML
HCT VFR BLD AUTO: 39.7 % (ref 37–47)
HCV AB SERPL QL IA: NONREACTIVE
HDLC SERPL-MCNC: 59 MG/DL (ref 35–60)
HGB BLD-MCNC: 12.9 G/DL (ref 12–16)
HGB UR QL STRIP: NEGATIVE
HYALINE CASTS #/AREA URNS LPF: ABNORMAL /LPF
IMM GRANULOCYTES # BLD AUTO: 0.02 X10(3)/MCL (ref 0–0.04)
IMM GRANULOCYTES NFR BLD AUTO: 0.3 %
KETONES UR QL STRIP: NEGATIVE
LDLC SERPL CALC-MCNC: 111 MG/DL (ref 50–140)
LEUKOCYTE ESTERASE UR QL STRIP: 75
LYMPHOCYTES # BLD AUTO: 2.79 X10(3)/MCL (ref 0.6–4.6)
LYMPHOCYTES NFR BLD AUTO: 38.2 %
MCH RBC QN AUTO: 29.3 PG (ref 27–31)
MCHC RBC AUTO-ENTMCNC: 32.5 G/DL (ref 33–36)
MCV RBC AUTO: 90.2 FL (ref 80–94)
MONOCYTES # BLD AUTO: 0.34 X10(3)/MCL (ref 0.1–1.3)
MONOCYTES NFR BLD AUTO: 4.7 %
MUCOUS THREADS URNS QL MICRO: ABNORMAL /LPF
N GONORRHOEA DNA SPEC QL NAA+PROBE: NOT DETECTED
NEUTROPHILS # BLD AUTO: 4.09 X10(3)/MCL (ref 2.1–9.2)
NEUTROPHILS NFR BLD AUTO: 56 %
NITRITE UR QL STRIP: NEGATIVE
NRBC BLD AUTO-RTO: 0 %
PH UR STRIP: 8 [PH]
PLATELET # BLD AUTO: 261 X10(3)/MCL (ref 130–400)
PMV BLD AUTO: 10 FL (ref 7.4–10.4)
POTASSIUM SERPL-SCNC: 3.8 MMOL/L (ref 3.5–5.1)
PROT SERPL-MCNC: 7.6 GM/DL (ref 6.4–8.3)
PROT UR QL STRIP: NEGATIVE
RBC # BLD AUTO: 4.4 X10(6)/MCL (ref 4.2–5.4)
RBC #/AREA URNS AUTO: ABNORMAL /HPF
SODIUM SERPL-SCNC: 140 MMOL/L (ref 136–145)
SOURCE (OHS): NORMAL
SP GR UR STRIP.AUTO: 1.01 (ref 1–1.03)
SQUAMOUS #/AREA URNS LPF: ABNORMAL /HPF
T PALLIDUM AB SER QL: NONREACTIVE
TRIGL SERPL-MCNC: 98 MG/DL (ref 37–140)
TSH SERPL-ACNC: 0.65 UIU/ML (ref 0.35–4.94)
UROBILINOGEN UR STRIP-ACNC: NORMAL
VLDLC SERPL CALC-MCNC: 20 MG/DL
WBC # BLD AUTO: 7.3 X10(3)/MCL (ref 4.5–11.5)
WBC #/AREA URNS AUTO: ABNORMAL /HPF

## 2024-09-18 PROCEDURE — 3044F HG A1C LEVEL LT 7.0%: CPT | Mod: CPTII,,, | Performed by: NURSE PRACTITIONER

## 2024-09-18 PROCEDURE — 3075F SYST BP GE 130 - 139MM HG: CPT | Mod: CPTII,,, | Performed by: NURSE PRACTITIONER

## 2024-09-18 PROCEDURE — 86360 T CELL ABSOLUTE COUNT/RATIO: CPT

## 2024-09-18 PROCEDURE — 83036 HEMOGLOBIN GLYCOSYLATED A1C: CPT

## 2024-09-18 PROCEDURE — 3008F BODY MASS INDEX DOCD: CPT | Mod: CPTII,,, | Performed by: NURSE PRACTITIONER

## 2024-09-18 PROCEDURE — 86780 TREPONEMA PALLIDUM: CPT

## 2024-09-18 PROCEDURE — 85025 COMPLETE CBC W/AUTO DIFF WBC: CPT

## 2024-09-18 PROCEDURE — 82306 VITAMIN D 25 HYDROXY: CPT

## 2024-09-18 PROCEDURE — 90677 PCV20 VACCINE IM: CPT | Mod: PBBFAC

## 2024-09-18 PROCEDURE — 87591 N.GONORRHOEAE DNA AMP PROB: CPT | Performed by: NURSE PRACTITIONER

## 2024-09-18 PROCEDURE — 81015 MICROSCOPIC EXAM OF URINE: CPT | Performed by: NURSE PRACTITIONER

## 2024-09-18 PROCEDURE — 84443 ASSAY THYROID STIM HORMONE: CPT

## 2024-09-18 PROCEDURE — 80053 COMPREHEN METABOLIC PANEL: CPT

## 2024-09-18 PROCEDURE — 90471 IMMUNIZATION ADMIN: CPT | Mod: PBBFAC

## 2024-09-18 PROCEDURE — 86803 HEPATITIS C AB TEST: CPT

## 2024-09-18 PROCEDURE — 3079F DIAST BP 80-89 MM HG: CPT | Mod: CPTII,,, | Performed by: NURSE PRACTITIONER

## 2024-09-18 PROCEDURE — 87536 HIV-1 QUANT&REVRSE TRNSCRPJ: CPT

## 2024-09-18 PROCEDURE — 80061 LIPID PANEL: CPT

## 2024-09-18 PROCEDURE — 1159F MED LIST DOCD IN RCRD: CPT | Mod: CPTII,,, | Performed by: NURSE PRACTITIONER

## 2024-09-18 PROCEDURE — 1160F RVW MEDS BY RX/DR IN RCRD: CPT | Mod: CPTII,,, | Performed by: NURSE PRACTITIONER

## 2024-09-18 PROCEDURE — 36415 COLL VENOUS BLD VENIPUNCTURE: CPT

## 2024-09-18 PROCEDURE — 86708 HEPATITIS A ANTIBODY: CPT

## 2024-09-18 PROCEDURE — 86480 TB TEST CELL IMMUN MEASURE: CPT

## 2024-09-18 PROCEDURE — 86706 HEP B SURFACE ANTIBODY: CPT

## 2024-09-18 PROCEDURE — 99213 OFFICE O/P EST LOW 20 MIN: CPT | Mod: PBBFAC | Performed by: NURSE PRACTITIONER

## 2024-09-18 PROCEDURE — 87491 CHLMYD TRACH DNA AMP PROBE: CPT | Performed by: NURSE PRACTITIONER

## 2024-09-18 PROCEDURE — 81001 URINALYSIS AUTO W/SCOPE: CPT | Performed by: NURSE PRACTITIONER

## 2024-09-18 PROCEDURE — 99214 OFFICE O/P EST MOD 30 MIN: CPT | Mod: S$PBB,,, | Performed by: NURSE PRACTITIONER

## 2024-09-18 RX ORDER — BICTEGRAVIR SODIUM, EMTRICITABINE, AND TENOFOVIR ALAFENAMIDE FUMARATE 50; 200; 25 MG/1; MG/1; MG/1
1 TABLET ORAL DAILY
Qty: 30 TABLET | Refills: 3 | Status: SHIPPED | OUTPATIENT
Start: 2024-09-18

## 2024-09-18 RX ORDER — ERGOCALCIFEROL 1.25 MG/1
50000 CAPSULE ORAL
Qty: 5 CAPSULE | Refills: 3 | Status: SHIPPED | OUTPATIENT
Start: 2024-09-18

## 2024-09-18 RX ADMIN — PNEUMOCOCCAL 20-VALENT CONJUGATE VACCINE 0.5 ML
2.2; 2.2; 2.2; 2.2; 2.2; 2.2; 2.2; 2.2; 2.2; 2.2; 2.2; 2.2; 2.2; 2.2; 2.2; 2.2; 4.4; 2.2; 2.2; 2.2 INJECTION, SUSPENSION INTRAMUSCULAR at 08:09

## 2024-09-18 NOTE — PROGRESS NOTES
Patient ID: Amy aHnson 48 y.o.     Chief Complaint:   Chief Complaint   Patient presents with    Followup HIV     States withdrawing at present        HPI:    9/18/24  Amy is a 49 yo WF here today for HIV f/u visit, accompanied by her sister. She tells me that she is taking Biktarvy & Pifeltro most days, but does miss one or two days per week.  She tolerates it well. Labs 3/24 VL 42, CD4 342. Will update labs today.  Admits to ongoing drug use, has escalated from heroin to fentanyl. She mentions that she would like help to quit drug use but has no desire to enter a treatment facility. Will provide her with a list of rehab facilities. Declines flu vaccine, appreciates PCV 20 today.  All questions answered & concerns addressed.    3/25/24  Amy is a 49 yo WF presenting today for HIV f/u visit, accompanied by sister. She is taking ART as prescribed, Biktarvy and Pifeltro daily due to MDR virus. She is tolerating it well, no noted side effects and no missed doses. She completed antimicrobials for UTI but is still having some urgency and frequency. Will repeat clean catch UA today to assess for bacterial eradication.  Pap smear NIL. MMG BI-RADS 1. She is taking vitamin D weekly as prescribed. She has an appointment tomorrow with surgery clinic for evaluation of lipoma to forearm. She remains clean and sober, still residing with sister at this time. All questions answered & concerns addressed.     2/8/24  Amy is a 49 yo WF here today for HIV f/u visit. She is accompanied by her sister who is aware of her status. Last labs VL 1.21 mil, CD4 347. She tells me that she took Triumeq for a couple of months after last visit, but ran out & has been off since November 2023. She missed a couple of f/u appointments, but is now residing with sister who will help her with appointments. Genotype results reviewed, mutations noted. Due to high viral load, will revise ART to Biktarvy 1 po daily. Pt voiced appreciation  and understanding. No DDIs noted with  medications. She remains clean & sober. Mood stable. She tells me that she is having urinary frequency & odor. Denies burning or pain. Last sexual encounter 3 months ago, protected. Never had anal intercourse. Due for cervical pap, amenable to same. STI screenings completed. Due for MMG order placed. She did return stool sample for cologuard, but was not able to be resulted. She declines to repeat testing at this juncture. Will defer to PCP. Declines flu vaccine today. She appreciates prescription for weekly vitamin D as well. She also tells me that she has a lump noted about 3 months ago to both forearms, tender with deep palpation. Soft, mobile. Will get u/s for further evaluation, voiced appreciation.  All questions answered & concerns addressed.              Past Medical History:   Diagnosis Date    Anemia     Bipolar disorder     Depression     HIV infection     Mitral valve prolapse     Seizures         Past Surgical History:   Procedure Laterality Date    EXCISION OF SOFT TISSUE Bilateral 4/3/2024    Procedure: EXCISION, SOFT TISSUE, Bilateral forearms;  Surgeon: Deepak Arevalo Jr., MD;  Location: [facility];  Service: General;  Laterality: Bilateral;  Likely MAC to avoid needle skins, patient is HIV+    FACIAL FRACTURE SURGERY      MOUTH SURGERY  03/08/2024    TUBAL LIGATION          Social History     Socioeconomic History    Marital status:    Tobacco Use    Smoking status: Every Day     Types: Vaping w/o nicotine    Smokeless tobacco: Never   Substance and Sexual Activity    Alcohol use: Never    Drug use: Yes     Types: Marijuana     Comment: 3 x weekly    Sexual activity: Not Currently     Partners: Male     Birth control/protection: Condom        Family History   Problem Relation Name Age of Onset    Heart disease Mother      Heart attack Mother      Hypertension Father      COPD Sister      Intellectual disability Sister          Review of patient's  "allergies indicates:  No Known Allergies     Immunization History   Administered Date(s) Administered    HPV 9-Valent 02/22/2019, 09/16/2019, 03/06/2020    Hepatitis A / Hepatitis B 08/21/2009    Influenza - Quadrivalent - PF (6-35 months) 03/06/2020    Influenza - Quadrivalent - PF *Preferred* (6 months and older) 10/02/2009, 10/30/2013    Influenza - Trivalent (ADULT) 10/02/2009, 10/30/2013    Influenza - Trivalent - PF (ADULT) 10/02/2009, 10/30/2013    Meningococcal Conjugate (MCV4P) 02/22/2019, 09/16/2019    Pneumococcal Conjugate - 13 Valent 06/02/2016    Pneumococcal Conjugate - 20 Valent 09/18/2024    Pneumococcal Conjugate - 7 Valent 01/18/2008    Pneumococcal Polysaccharide - 23 Valent 01/18/2008, 03/27/2017    Tdap 06/02/2016        Review of Systems   Constitutional: Negative.    HENT: Negative.     Eyes: Negative.    Respiratory: Negative.     Cardiovascular: Negative.    Gastrointestinal: Negative.    Genitourinary: Negative.    Musculoskeletal: Negative.    Skin: Negative.    Neurological: Negative.    Endo/Heme/Allergies: Negative.    Psychiatric/Behavioral: Negative.     All other systems reviewed and are negative.         Objective:      /86 (BP Location: Right arm, Patient Position: Sitting, BP Method: Medium (Automatic))   Pulse 94   Temp 98.3 °F (36.8 °C) (Oral)   Resp 12   Ht 5' 3" (1.6 m)   Wt 76.5 kg (168 lb 12.2 oz)   BMI 29.89 kg/m²      Physical Exam  Vitals reviewed.   Constitutional:       General: She is not in acute distress.     Appearance: Normal appearance. She is not toxic-appearing.   HENT:      Mouth/Throat:      Mouth: Mucous membranes are moist.      Pharynx: Oropharynx is clear.   Eyes:      Conjunctiva/sclera: Conjunctivae normal.   Cardiovascular:      Rate and Rhythm: Normal rate and regular rhythm.   Pulmonary:      Effort: Pulmonary effort is normal. No respiratory distress.      Breath sounds: Normal breath sounds.   Abdominal:      General: Abdomen is flat. " Bowel sounds are normal.      Palpations: Abdomen is soft.   Musculoskeletal:         General: Normal range of motion.      Cervical back: Normal range of motion.   Lymphadenopathy:      Cervical: No cervical adenopathy.   Skin:     General: Skin is warm and dry.   Neurological:      General: No focal deficit present.      Mental Status: She is alert and oriented to person, place, and time. Mental status is at baseline.   Psychiatric:         Attention and Perception: She is inattentive.         Mood and Affect: Mood is elated. Affect is inappropriate.         Speech: Speech is rapid and pressured.         Thought Content: Thought content does not include suicidal ideation. Thought content does not include homicidal or suicidal plan.          Labs:   Lab Results   Component Value Date    WBC 7.30 09/18/2024    HGB 12.9 09/18/2024    HCT 39.7 09/18/2024    MCV 90.2 09/18/2024     09/18/2024       CMP  Sodium   Date Value Ref Range Status   09/18/2024 140 136 - 145 mmol/L Final     Potassium   Date Value Ref Range Status   09/18/2024 3.8 3.5 - 5.1 mmol/L Final     Chloride   Date Value Ref Range Status   09/18/2024 105 98 - 107 mmol/L Final     CO2   Date Value Ref Range Status   09/18/2024 26 22 - 29 mmol/L Final     Blood Urea Nitrogen   Date Value Ref Range Status   09/18/2024 5.1 (L) 7.0 - 18.7 mg/dL Final     Creatinine   Date Value Ref Range Status   09/18/2024 0.79 0.55 - 1.02 mg/dL Final     Calcium   Date Value Ref Range Status   09/18/2024 10.1 8.4 - 10.2 mg/dL Final     Albumin   Date Value Ref Range Status   09/18/2024 4.0 3.5 - 5.0 g/dL Final     Bilirubin Total   Date Value Ref Range Status   09/18/2024 0.4 <=1.5 mg/dL Final     ALP   Date Value Ref Range Status   09/18/2024 88 40 - 150 unit/L Final     AST   Date Value Ref Range Status   09/18/2024 14 5 - 34 unit/L Final     ALT   Date Value Ref Range Status   09/18/2024 10 0 - 55 unit/L Final     eGFR   Date Value Ref Range Status   09/18/2024  >60 mL/min/1.73/m2 Final     Lab Results   Component Value Date    TSH 0.654 09/18/2024     Hep C Ab Interp   Date Value Ref Range Status   09/18/2024 Nonreactive Nonreactive Final     Syphilis Antibody   Date Value Ref Range Status   09/18/2024 Nonreactive Nonreactive, Equivocal Final     Cholesterol Total   Date Value Ref Range Status   09/18/2024 190 <=200 mg/dL Final     HDL Cholesterol   Date Value Ref Range Status   09/18/2024 59 35 - 60 mg/dL Final     Triglyceride   Date Value Ref Range Status   09/18/2024 98 37 - 140 mg/dL Final     Cholesterol/HDL Ratio   Date Value Ref Range Status   09/18/2024 3 0 - 5 Final     Very Low Density Lipoprotein   Date Value Ref Range Status   09/18/2024 20  Final     LDL Cholesterol   Date Value Ref Range Status   09/18/2024 111.00 50.00 - 140.00 mg/dL Final     Vitamin D   Date Value Ref Range Status   09/18/2024 25 (L) 30 - 80 ng/mL Final     Results for orders placed or performed in visit on 08/17/23   CD4 Lymphocytes   Result Value Ref Range    Patient Age 47     WBC Absolute 6,600 4,500 - 11,500 /mm3    Lymph Percent 39 28 - 48 %    Lymph Absolute 2,574 1,260 - 5,520 x10(3)/mcL    CD4 % 13.5 %    CD4 Absolute 347.49 (L) 589 - 1,505 unit/L    T Cell Interp       Normal absolute lymphocyte count with decreased absolute CD4+ lymphocyte count.    Rohit Navarro M.D.     Narrative    This test was developed and its performance characteristics determined by [facility]. It has not been cleared or approved by the US Food and Drug Administration. The FDA does not require this test to go through premarket FDA review. This test is used for clinical purposes. It should not be regarded as investigational or for research. This laboratory is certified under the Clinical Laboratory Improvement Amendments (CLIA) as qualified to perform high complexity clinical laboratory testing.     Results for orders placed or performed in visit on 03/25/24   HIV-1  RNA, Quantitative, PCR with Reflex to Genotype   Result Value Ref Range    HIV-1 RNA Detect/Quant, P 42 (A) Undetected copies/mL     Results for orders placed or performed in visit on 08/17/23   Quantiferon Gold TB   Result Value Ref Range    QuantiFERON-Tb Gold Plus Result Negative Negative    TB1 Ag minus Nil Result 0.04 IU/mL    TB2 Ag minus Nil Result 0.02 IU/mL    Mitogen minus Nil Result 9.93 IU/mL    Nil Result 0.07 IU/mL     Results for orders placed or performed in visit on 08/17/23   C.trach/N.gonor AMP RNA    Specimen: Throat   Result Value Ref Range    Chlamydia trachomatis amplified RNA Negative Negative    Neisseria gonorrhoeae amplified RNA Negative Negative    Source THROAT     SOURCE: THROAT      Results for orders placed or performed in visit on 03/25/24   Urinalysis   Result Value Ref Range    Color, UA Light-Yellow Yellow, Light-Yellow, Dark Yellow, Mary, Straw    Appearance, UA Turbid (A) Clear    Specific Gravity, UA 1.009 1.005 - 1.030    pH, UA 7.5 5.0 - 8.5    Protein, UA Negative Negative    Glucose, UA Normal Negative, Normal    Ketones, UA Negative Negative    Blood, UA Negative Negative    Bilirubin, UA Negative Negative    Urobilinogen, UA Normal 0.2, 1.0, Normal    Nitrites, UA 2+ (A) Negative    Leukocyte Esterase,  (A) Negative    WBC, UA 11-20 (A) None Seen, 0-2, 3-5, 0-5 /HPF    Bacteria, UA Trace (A) None Seen /HPF    Squamous Epithelial Cells, UA Few (A) None Seen /HPF    Hyaline Casts, UA None Seen None Seen /lpf    RBC, UA 0-5 None Seen, 0-2, 3-5, 0-5 /HPF       Imaging: Reviewed most recent relevant imaging studies available, notable results highlighted in this note    Medications:     Current Outpatient Medications   Medication Instructions    mmlqmtdzs-hgitpmlc-ftcxnrc ala (BIKTARVY) -25 mg (25 kg or greater) 1 tablet, Oral, Daily    cyanocobalamin (VITAMIN B-12) 100 mcg, Oral    divalproex ER (DEPAKOTE ER) 500 mg, Oral, 3 times daily    doravirine 100 mg, Oral,  Daily, Take together with Biktarvy.    ergocalciferol (ERGOCALCIFEROL) 50,000 Units, Oral, Every 7 days    FLUoxetine 40 mg, Oral    HYDROcodone-acetaminophen (NORCO) 5-325 mg per tablet 1 tablet, Oral, Every 6 hours PRN    QUEtiapine (SEROQUEL) 600 mg, Oral, Nightly    senna-docusate 8.6-50 mg (SENNA WITH DOCUSATE SODIUM) 8.6-50 mg per tablet 1 tablet, Oral, Daily       Assessment:       Problem List Items Addressed This Visit    None  Visit Diagnoses       HIV disease    -  Primary    Relevant Medications    pwapbogst-tkixuweo-muzmrer ala (BIKTARVY) -25 mg (25 kg or greater)    doravirine 100 mg Tab    ergocalciferol (ERGOCALCIFEROL) 50,000 unit Cap    Other Relevant Orders    Quantiferon Gold TB    Hemoglobin A1C (Completed)    Hepatitis C Antibody (Completed)    Vitamin D (Completed)    TSH (Completed)    Lipid Panel (Completed)    SYPHILIS ANTIBODY (WITH REFLEX RPR) (Completed)    Chlamydia/GC, PCR (Completed)    Comprehensive Metabolic Panel (Completed)    CBC Auto Differential (Completed)    CD4 Lymphocytes    HIV-1 RNA, Quantitative, PCR with Reflex to Genotype    Hepatitis A antibody, IgG (Completed)    Hepatitis B Surface Ab, Qualitative (Completed)    Urinalysis, Reflex to Urine Culture (Completed)    Drug resistance to antiretroviral therapy        Relevant Medications    doravirine 100 mg Tab    Need for vaccination        Relevant Medications    pneumoc 20-alex conj-dip cr(PF) (PREVNAR-20 (PF)) injection Syrg 0.5 mL (Completed)               Plan:      HIV disease  Drug resistance to antiretroviral therapy  -     doravirine 100 mg Tab; Take 1 tablet (100 mg total) by mouth once daily. Take together with Biktarvy.  Dispense: 30 tablet; Refill: 3  -     hzksycsge-fmlrtyjs-onvkkth ala (BIKTARVY) -25 mg (25 kg or greater); Take 1 tablet by mouth once daily.  Dispense: 30 tablet; Refill: 3  -     doravirine 100 mg Tab; Take 1 tablet (100 mg total) by mouth once daily. Take together with Biktarvy.   Dispense: 30 tablet; Refill: 3  -     ergocalciferol (ERGOCALCIFEROL) 50,000 unit Cap; Take 1 capsule (50,000 Units total) by mouth every 7 days.  Dispense: 5 capsule; Refill: 3  -     Quantiferon Gold TB; Future; Expected date: 09/18/2024  -     Hemoglobin A1C; Future; Expected date: 09/18/2024  -     Hepatitis C Antibody; Future; Expected date: 09/18/2024  -     Vitamin D; Future; Expected date: 09/18/2024  -     TSH; Future; Expected date: 09/18/2024  -     Lipid Panel; Future; Expected date: 09/18/2024  -     SYPHILIS ANTIBODY (WITH REFLEX RPR); Future; Expected date: 09/18/2024  -     Chlamydia/GC, PCR  -     Comprehensive Metabolic Panel; Future; Expected date: 09/18/2024  -     CBC Auto Differential; Future; Expected date: 09/18/2024  -     CD4 Lymphocytes; Future; Expected date: 09/18/2024  -     HIV-1 RNA, Quantitative, PCR with Reflex to Genotype; Future; Expected date: 09/18/2024  -     Hepatitis A antibody, IgG; Future; Expected date: 09/18/2024  -     Hepatitis B Surface Ab, Qualitative; Future; Expected date: 09/18/2024  -     Urinalysis, Reflex to Urine Culture  Diagnosed 7/24/2006.  Extensive adherence and sexual health counseling done.  Education provided for patient & her sister whom she is currently residing with.  Use condoms for all sexual encounters.  Blood precautions.  Continue Biktarvy 1 po daily and Pifeltro 100 mg daily.   Labs today as ordered.  Drug rehab strongly encouraged. Offered Narcan rx, refused.   RTC 3 months with Amanda.      Wellness:  Cervical pap: ~2019 Dr. Hooker, 2/8/24 NIL  Anal pap: 9/2019 NIL  Cervical CT/GC: 8/23 Neg, 2/24  Anal CT/GC: 9/2019 Neg  Oral CT/GC: 9/2019 Neg, 8/23 Neg  Eye exam: 9/2019  MMG: ~2019 [place], ordered 2/24  DEXA: N/A  Colon cancer screen: Cologuard not resulted 10/23. Declines repeat order at this time 2/24.    Need for vaccination  -     pneumoc 20-alex conj-dip cr(PF) (PREVNAR-20 (PF)) injection Syrg 0.5 mL  PCV 20 today.

## 2024-09-18 NOTE — PROGRESS NOTES
Pneumonia Vaccination given IM right deltoid using aseptic tech. Patient tolerated well. To contact clinic prn.

## 2024-09-19 LAB
CD3 CELLS # BLD: 2019 CELLS/MCL (ref 550–2202)
CD3 CELLS NFR BLD: 82 % (ref 58–86)
CD3+CD4+ CELLS # BLD: 491 CELLS/MCL (ref 365–1437)
CD3+CD4+ CELLS NFR BLD: 20 % (ref 32–64)
CD3+CD4+ CELLS/CD3+CD8+ CLL BLD: 0.3 %
CD3+CD8+ CELLS # BLD: 1493 CELLS/MCL (ref 145–846)
CD3+CD8+ CELLS NFR BLD: 61 % (ref 13–40)
CD45 CELLS # BLD: 2.46 THOU/MCL (ref 0.82–2.84)
HIV1 RNA # PLAS NAA DL=20: NORMAL COPIES/ML

## 2024-09-20 LAB
GAMMA INTERFERON BACKGROUND BLD IA-ACNC: 0.05 IU/ML
M TB IFN-G BLD-IMP: NEGATIVE
M TB IFN-G CD4+ BCKGRND COR BLD-ACNC: -0.02 IU/ML
M TB IFN-G CD4+CD8+ BCKGRND COR BLD-ACNC: -0.02 IU/ML
MITOGEN IGNF BCKGRD COR BLD-ACNC: 7.18 IU/ML

## 2024-10-15 ENCOUNTER — TELEPHONE (OUTPATIENT)
Dept: INFECTIOUS DISEASES | Facility: CLINIC | Age: 49
End: 2024-10-15
Payer: MEDICAID

## 2024-10-15 NOTE — TELEPHONE ENCOUNTER
Phoned patient. Informed of results. Lab work done 09/18/2024. CD4 491, HIV VL undetected. Voiced understanding and appreciates call.

## 2024-10-15 NOTE — TELEPHONE ENCOUNTER
----- Message from Parris sent at 10/15/2024  8:19 AM CDT -----  Patient of Carolyn    Patient requesting call back regarding lab results       Please contact when available [phone] [id number]

## 2025-02-20 ENCOUNTER — HOSPITAL ENCOUNTER (EMERGENCY)
Facility: HOSPITAL | Age: 50
Discharge: HOME | End: 2025-02-20
Attending: EMERGENCY MEDICINE
Payer: MEDICAID

## 2025-02-20 VITALS
TEMPERATURE: 98 F | HEART RATE: 81 BPM | SYSTOLIC BLOOD PRESSURE: 118 MMHG | DIASTOLIC BLOOD PRESSURE: 69 MMHG | OXYGEN SATURATION: 98 % | WEIGHT: 180.63 LBS | BODY MASS INDEX: 35.46 KG/M2 | HEIGHT: 60 IN | RESPIRATION RATE: 16 BRPM

## 2025-02-20 DIAGNOSIS — S62.316A DISPLACED FRACTURE OF BASE OF FIFTH METACARPAL BONE, RIGHT HAND, INITIAL ENCOUNTER FOR CLOSED FRACTURE: Primary | ICD-10-CM

## 2025-02-20 PROCEDURE — 90471 IMMUNIZATION ADMIN: CPT

## 2025-02-20 PROCEDURE — 25000003 PHARM REV CODE 250

## 2025-02-20 PROCEDURE — 63600175 PHARM REV CODE 636 W HCPCS

## 2025-02-20 PROCEDURE — 99284 EMERGENCY DEPT VISIT MOD MDM: CPT | Mod: 25

## 2025-02-20 PROCEDURE — 90715 TDAP VACCINE 7 YRS/> IM: CPT

## 2025-02-20 PROCEDURE — 29125 APPL SHORT ARM SPLINT STATIC: CPT | Mod: RT

## 2025-02-20 RX ORDER — KETOROLAC TROMETHAMINE 10 MG/1
10 TABLET, FILM COATED ORAL EVERY 6 HOURS
Qty: 20 TABLET | Refills: 0 | Status: SHIPPED | OUTPATIENT
Start: 2025-02-20 | End: 2025-02-26 | Stop reason: HOSPADM

## 2025-02-20 RX ORDER — HYDROCODONE BITARTRATE AND ACETAMINOPHEN 5; 325 MG/1; MG/1
1 TABLET ORAL
Refills: 0 | Status: COMPLETED | OUTPATIENT
Start: 2025-02-20 | End: 2025-02-20

## 2025-02-20 RX ORDER — HYDROCODONE BITARTRATE AND ACETAMINOPHEN 5; 325 MG/1; MG/1
1 TABLET ORAL EVERY 6 HOURS PRN
Qty: 12 TABLET | Refills: 0 | Status: SHIPPED | OUTPATIENT
Start: 2025-02-20 | End: 2025-02-26 | Stop reason: HOSPADM

## 2025-02-20 RX ADMIN — HYDROCODONE BITARTRATE AND ACETAMINOPHEN 1 TABLET: 5; 325 TABLET ORAL at 03:02

## 2025-02-20 RX ADMIN — TETANUS TOXOID, REDUCED DIPHTHERIA TOXOID AND ACELLULAR PERTUSSIS VACCINE, ADSORBED 0.5 ML: 5; 2.5; 8; 8; 2.5 SUSPENSION INTRAMUSCULAR at 04:02

## 2025-02-20 NOTE — Clinical Note
"Amy Hanson (Danielle) was seen and treated in our emergency department on 2/20/2025.  She may return to work on 02/24/2025.       If you have any questions or concerns, please don't hesitate to call.       LPN    "

## 2025-02-20 NOTE — DISCHARGE INSTRUCTIONS
Call Dr. Bingham tomorrow to make appointment for Monday. Toradol as needed for pain, alternate with tylenol for pain. If no relief can take a norco. Wear splint at all times. Return with new or worsening symptoms.

## 2025-02-20 NOTE — ED PROVIDER NOTES
Encounter Date: 2/20/2025       History     Chief Complaint   Patient presents with    Hand Injury     C/o right hand pain/bruising after punching her dog yesterday     See MDM.     The history is provided by the patient. No  was used.     Review of patient's allergies indicates:  No Known Allergies  Past Medical History:   Diagnosis Date    Anemia     Bipolar disorder     Depression     HIV infection     Mitral valve prolapse     Seizures      Past Surgical History:   Procedure Laterality Date    EXCISION OF SOFT TISSUE Bilateral 4/3/2024    Procedure: EXCISION, SOFT TISSUE, Bilateral forearms;  Surgeon: Deepak Arevalo Jr., MD;  Location: [facility];  Service: General;  Laterality: Bilateral;  Likely MAC to avoid needle skins, patient is HIV+    FACIAL FRACTURE SURGERY      MOUTH SURGERY  03/08/2024    TUBAL LIGATION       Family History   Problem Relation Name Age of Onset    Heart disease Mother      Heart attack Mother      Hypertension Father      COPD Sister      Intellectual disability Sister       Social History[1]  Review of Systems   Musculoskeletal:  Positive for arthralgias and myalgias.   All other systems reviewed and are negative.      Physical Exam     Initial Vitals   BP Pulse Resp Temp SpO2   02/20/25 1436 02/20/25 1435 02/20/25 1435 02/20/25 1435 02/20/25 1435   (!) 116/91 87 18 98.8 °F (37.1 °C) 99 %      MAP       --                Physical Exam    Nursing note and vitals reviewed.  Constitutional: She appears well-developed and well-nourished. She is not diaphoretic. No distress.   HENT:   Head: Normocephalic and atraumatic.   Cardiovascular:  Normal rate and intact distal pulses.           Pulmonary/Chest: No respiratory distress.   Musculoskeletal:      Comments: Right hand dorsal aspect swelling and ecchymosis, no obvious deformity due to swelling. Tenderness to palpation over 4th and 5th metacarpal. Restricted range of motion of 4th and 5th digit. No abrasion,  laceration, open wound. Radial pulse palpable. Neurovascularly intact. All other adjacent joints otherwise normal.       Neurological: She is alert and oriented to person, place, and time.   Skin: Skin is warm and dry.   Psychiatric: She has a normal mood and affect. Her behavior is normal.         ED Course   Procedures  Labs Reviewed - No data to display       Imaging Results              X-Ray Hand 3 view Right (Final result)  Result time 02/20/25 15:18:51      Final result by James Jerome MD (02/20/25 15:18:51)                   Impression:      1. Mildly comminuted displaced angulated oblique fracture at the 5th the metacarpal shaft/neck      Electronically signed by: James Jerome  Date:    02/20/2025  Time:    15:18               Narrative:    EXAMINATION:  XR HAND COMPLETE 3 VIEW RIGHT    CLINICAL HISTORY:  ; pain;.    COMPARISON:  None available.    FINDINGS:  AP, lateral, and oblique views revealmildly comminuted angulated displaced oblique fracture at the 5th metacarpal shaft/neck.  No other fracture or dislocation is seen.  Joint spaces appear grossly intact.                                       Medications   Tdap (BOOSTRIX) vaccine injection 0.5 mL (has no administration in time range)   HYDROcodone-acetaminophen 5-325 mg per tablet 1 tablet (1 tablet Oral Given 2/20/25 1503)     Medical Decision Making  Pt is a 48 y/o female who presents with right hand pain since yesterday. States that she punched her dog, dog is a 90 lb pitbull. Punched in head. Did not come yesterday because was afraid it was dislocated and would have to have reduced. Rates pain a 10/10, has taken tylenol and ibuprofen 600 without relief. Pain worse with using the hand.     Right hand xray shows mildly comminuted displaced angulated oblique fracture at the 5th the metacarpal shaft/neck. Discussed pt with Dr. Warner who advised ulnar gutter splint and to call office tomorrow for appointment on Monday. Strict ED precautions  given. Pt expressed understanding and were in agreement with the plan.     Amount and/or Complexity of Data Reviewed  Radiology: ordered. Decision-making details documented in ED Course.  Discussion of management or test interpretation with external provider(s): Discussed pt with Dr. Warner who advised ulnar gutter splint and to call office tomorrow for appointment on Monday.     Risk  Prescription drug management.      Additional MDM:   Differential Diagnosis:   Other: The following diagnoses were also considered and will be evaluated: dislocation, fracture and contusion.                                   Clinical Impression:  Final diagnoses:  [S62.316A] Displaced fracture of base of fifth metacarpal bone, right hand, initial encounter for closed fracture (Primary)          ED Disposition Condition    Discharge Stable          ED Prescriptions       Medication Sig Dispense Start Date End Date Auth. Provider    HYDROcodone-acetaminophen (NORCO) 5-325 mg per tablet Take 1 tablet by mouth every 6 (six) hours as needed for Pain. 12 tablet 2/20/2025 -- Parris Alejandre PA    ketorolac (TORADOL) 10 mg tablet Take 1 tablet (10 mg total) by mouth every 6 (six) hours. for 5 days 20 tablet 2/20/2025 2/25/2025 Parris Alejandre PA          Follow-up Information       Follow up With Specialties Details Why Contact Info    Gerardo Bingham MD Orthopedic Surgery Schedule an appointment as soon as possible for a visit in 1 day  [address] [place] [id number]  [phone]                 [1]   Social History  Tobacco Use    Smoking status: Every Day     Types: Vaping w/o nicotine    Smokeless tobacco: Never   Substance Use Topics    Alcohol use: Never    Drug use: Yes     Types: Marijuana     Comment: 3 x weekly        Parris Alejandre PA  02/20/25 [id number]

## 2025-02-24 ENCOUNTER — OFFICE VISIT (OUTPATIENT)
Dept: ORTHOPEDICS | Facility: CLINIC | Age: 50
End: 2025-02-24
Attending: INTERNAL MEDICINE
Payer: MEDICAID

## 2025-02-24 ENCOUNTER — HOSPITAL ENCOUNTER (EMERGENCY)
Facility: HOSPITAL | Age: 50
Discharge: HOME | End: 2025-02-24
Attending: EMERGENCY MEDICINE
Payer: MEDICAID

## 2025-02-24 VITALS
TEMPERATURE: 98 F | DIASTOLIC BLOOD PRESSURE: 78 MMHG | HEART RATE: 78 BPM | RESPIRATION RATE: 16 BRPM | OXYGEN SATURATION: 100 % | SYSTOLIC BLOOD PRESSURE: 146 MMHG | BODY MASS INDEX: 34.6 KG/M2 | HEIGHT: 60 IN | WEIGHT: 176.25 LBS

## 2025-02-24 VITALS
HEIGHT: 60 IN | BODY MASS INDEX: 34.55 KG/M2 | TEMPERATURE: 99 F | WEIGHT: 176 LBS | SYSTOLIC BLOOD PRESSURE: 142 MMHG | DIASTOLIC BLOOD PRESSURE: 78 MMHG

## 2025-02-24 DIAGNOSIS — S62.306D: ICD-10-CM

## 2025-02-24 DIAGNOSIS — S62.306D: Primary | ICD-10-CM

## 2025-02-24 PROCEDURE — 3078F DIAST BP <80 MM HG: CPT | Mod: CPTII,,, | Performed by: ORTHOPAEDIC SURGERY

## 2025-02-24 PROCEDURE — 29125 APPL SHORT ARM SPLINT STATIC: CPT | Mod: RT

## 2025-02-24 PROCEDURE — 1159F MED LIST DOCD IN RCRD: CPT | Mod: CPTII,,, | Performed by: ORTHOPAEDIC SURGERY

## 2025-02-24 PROCEDURE — 99204 OFFICE O/P NEW MOD 45 MIN: CPT | Mod: S$PBB,,, | Performed by: ORTHOPAEDIC SURGERY

## 2025-02-24 PROCEDURE — 3008F BODY MASS INDEX DOCD: CPT | Mod: CPTII,,, | Performed by: ORTHOPAEDIC SURGERY

## 2025-02-24 PROCEDURE — 99282 EMERGENCY DEPT VISIT SF MDM: CPT | Mod: 25,27

## 2025-02-24 PROCEDURE — 99215 OFFICE O/P EST HI 40 MIN: CPT | Mod: PBBFAC,25

## 2025-02-24 PROCEDURE — 3077F SYST BP >= 140 MM HG: CPT | Mod: CPTII,,, | Performed by: ORTHOPAEDIC SURGERY

## 2025-02-24 RX ORDER — SODIUM CHLORIDE 9 MG/ML
INJECTION, SOLUTION INTRAVENOUS CONTINUOUS
Status: CANCELLED | OUTPATIENT
Start: 2025-02-24

## 2025-02-24 RX ORDER — MUPIROCIN 20 MG/G
OINTMENT TOPICAL
Status: CANCELLED | OUTPATIENT
Start: 2025-02-24

## 2025-02-24 RX ORDER — CEFAZOLIN SODIUM 2 G/50ML
2 SOLUTION INTRAVENOUS
Status: CANCELLED | OUTPATIENT
Start: 2025-02-24

## 2025-02-24 NOTE — PROGRESS NOTES
Faculty Attestation: Amy Hanson  was seen at [facility] in the Orthopaedic Clinic. Patient seen and evaluated at the time of the visit. History of Present Illness, Physical Exam, and Assessment and Plan reviewed. Treatment plan is reasonable and appropriate. Compliance with treatment recommendations is important. No procedure was performed.     Ifeanyi Ryder MD  Orthopaedic Surgery

## 2025-02-24 NOTE — PROGRESS NOTES
[facility]  New Patient Office Visit  02/24/2025       Patient ID: Amy Hanson  YOB: 1975  MRN: [id number]    Diagnosis:    The encounter diagnosis was Closed displaced fracture of fifth metacarpal bone of right hand with routine healing, subsequent encounter.     Chief Complaint: Pain of the Right Hand      Amy Hanson is a 49 y.o. female who presents as a new patient for treatment of the above mentioned diagnosis.  Right-hand dominant, currently not working.  Patient punched her dog on 02/19/2025 sustained a right 5th metacarpal fracture.  She went to an outside facility was placed in a splint after x-rays showed the fracture.  She is here for follow up.  She does have HIV viral load undetectable.        Past Medical History:    Past Medical History:   Diagnosis Date    Anemia     Bipolar disorder     Depression     HIV infection     Mitral valve prolapse     Seizures      Past Surgical History:   Procedure Laterality Date    EXCISION OF SOFT TISSUE Bilateral 4/3/2024    Procedure: EXCISION, SOFT TISSUE, Bilateral forearms;  Surgeon: Deepak Arevalo Jr., MD;  Location: [facility];  Service: General;  Laterality: Bilateral;  Likely MAC to avoid needle skins, patient is HIV+    FACIAL FRACTURE SURGERY      MOUTH SURGERY  03/08/2024    TUBAL LIGATION       Family History   Problem Relation Name Age of Onset    Heart disease Mother      Heart attack Mother      Hypertension Father      COPD Sister      Intellectual disability Sister       Social History[1]  Medication List with Changes/Refills   Current Medications    OKVTWBONF-PDEKMXAE-MPRVAZI ALA (BIKTARVY) -25 MG (25 KG OR GREATER)    Take 1 tablet by mouth once daily.    CYANOCOBALAMIN (VITAMIN B-12) 100 MCG TABLET    Take 100 mcg by mouth.    DIVALPROEX ER (DEPAKOTE) 500 MG TB24    Take 500 mg by mouth 3 (three) times daily.    DORAVIRINE 100 MG TAB    Take 1 tablet (100 mg total) by mouth once daily.  Take together with Biktarvy.    ERGOCALCIFEROL (ERGOCALCIFEROL) 50,000 UNIT CAP    Take 1 capsule (50,000 Units total) by mouth every 7 days.    FLUOXETINE 40 MG CAPSULE    Take 40 mg by mouth.    HYDROCODONE-ACETAMINOPHEN (NORCO) 5-325 MG PER TABLET    Take 1 tablet by mouth every 6 (six) hours as needed for Pain.    HYDROCODONE-ACETAMINOPHEN (NORCO) 5-325 MG PER TABLET    Take 1 tablet by mouth every 6 (six) hours as needed for Pain.    KETOROLAC (TORADOL) 10 MG TABLET    Take 1 tablet (10 mg total) by mouth every 6 (six) hours. for 5 days    QUETIAPINE (SEROQUEL) 300 MG TAB    Take 600 mg by mouth nightly.    SENNA-DOCUSATE 8.6-50 MG (SENNA WITH DOCUSATE SODIUM) 8.6-50 MG PER TABLET    Take 1 tablet by mouth once daily.     Review of patient's allergies indicates:  No Known Allergies    ROS:    Body mass index is 34.37 kg/m².  GENERAL: Well appearing, appropriate for stated age, no acute distress.  CARDIOVASCULAR: Pulses regular by peripheral palpation.  PULMONARY: Respirations are even and non-labored.  NEURO: Awake, alert, and oriented x 3.  PSYCH: Mood & affect are appropriate.  HEENT: Head is normocephalic and atraumatic.    Physical Exam:    Right upper extremity   No open wounds or abrasions   Ecchymosis around the 5th metacarpal   Prominence over the 5th metacarpal fracture site   Tenderness over the 5th metacarpal fracture   Motor intact AIN/PIN/ulnar   [place] M/U/R   Hand warm well perfused  Scissoring of the 5th digit with attempted flexion    Imaging:  X-rays of the right hand obtained and interpreted today demonstrate displaced fracture of the 5th metacarpal      Assessment and Plan:    Amy Hanson is a 49 y.o. female seen in the office today for right 5th metacarpal fracture sustained 02/19/2025    Given the displacement and scissoring on exam we would recommend ORIF of the right 5th metacarpal.  Risks, benefits, and alternatives discussed including but not limited to infection, bleeding, damage  to surrounding structures, need for further surgery, hardware complications, wound complications, continued pain and stiffness, malrotation, nonunion or malunion.  Patient would like to proceed with ORIF of the right 5th metacarpal.  We will schedule her for 02/27/2025.  We will place her back in a splint today.      Orders Placed This Encounter    X-Ray Hand Complete Right    Diet NPO    Cleanse with Chlorhexidine (CHG)    Place MIGUEL hose    Place sequential compression device    IP VTE LOW RISK PATIENT    Case Request Operating Room: ORIF, FRACTURE, METACARPAL BONE, FIFTH               [1]   Social History  Socioeconomic History    Marital status:    Tobacco Use    Smoking status: Every Day     Types: Vaping w/o nicotine    Smokeless tobacco: Never   Substance and Sexual Activity    Alcohol use: Never    Drug use: Yes     Types: Marijuana     Comment: 3 x weekly    Sexual activity: Not Currently     Partners: Male     Birth control/protection: Condom

## 2025-02-24 NOTE — ED PROVIDER NOTES
Encounter Date: 2/24/2025       History     Chief Complaint   Patient presents with    Orthopedic Referral     According to patient; broke hand last Wednesday and was seen at hospital in [place] on Thursday. Was sent referral to ortho but her insurance is not accepted by the provider. Requesting another ortho referral.      The patient was seen at [facility] on 2/20 after punching a wall. She was diagnosed with a fifth metacarpal fracture and placed in an ulnar gutter splint. She has pain medication. She is here today requesting referral to the [facility] ortho clinic.      Review of patient's allergies indicates:  No Known Allergies  Past Medical History:   Diagnosis Date    Anemia     Bipolar disorder     Depression     HIV infection     Mitral valve prolapse     Seizures      Past Surgical History:   Procedure Laterality Date    EXCISION OF SOFT TISSUE Bilateral 4/3/2024    Procedure: EXCISION, SOFT TISSUE, Bilateral forearms;  Surgeon: Deepak Arevalo Jr., MD;  Location: [facility];  Service: General;  Laterality: Bilateral;  Likely MAC to avoid needle skins, patient is HIV+    FACIAL FRACTURE SURGERY      MOUTH SURGERY  03/08/2024    TUBAL LIGATION       Family History   Problem Relation Name Age of Onset    Heart disease Mother      Heart attack Mother      Hypertension Father      COPD Sister      Intellectual disability Sister       Social History[1]  Review of Systems   Constitutional:  Negative for fever.   HENT:  Negative for sore throat.    Respiratory:  Negative for shortness of breath.    Cardiovascular:  Negative for chest pain.   Gastrointestinal:  Negative for nausea.   Genitourinary:  Negative for dysuria.   Musculoskeletal:  Positive for arthralgias. Negative for back pain.   Skin:  Negative for rash.   Neurological:  Negative for weakness.   Hematological:  Does not bruise/bleed easily.   All other systems reviewed and are negative.      Physical Exam     Initial Vitals [02/24/25 0702]   BP Pulse  Resp Temp SpO2   (!) 151/82 83 16 97.5 °F (36.4 °C) 99 %      MAP       --         Physical Exam    Nursing note and vitals reviewed.  Constitutional: She appears well-developed and well-nourished.   HENT:   Head: Normocephalic and atraumatic.   Neck: Neck supple.   Normal range of motion.  Cardiovascular:  Normal rate, regular rhythm, normal heart sounds and intact distal pulses.           Pulmonary/Chest: Effort normal and breath sounds normal.   Abdominal: Abdomen is soft and flat. Bowel sounds are normal. There is no abdominal tenderness.   Musculoskeletal:      Cervical back: Normal range of motion and neck supple.      Comments: Ulnar gutter splint to right hand removed for exam and replaced. Mild ttp and swelling over fifth metacarpal. Good movement with fingers and brisk cap refill, NVI     Neurological: She is alert. She has normal strength.   Skin: Skin is warm and dry.   Psychiatric: She has a normal mood and affect.         ED Course   Splint Application    Date/Time: 2/24/2025 7:33 AM    Performed by: Too Johnson ACNP  Authorized by: Too Johnson ACNP  Location details: right hand  Splint type: ulnar gutter  Supplies used: cotton padding, elastic bandage and Ortho-Glass  Post-procedure: The splinted body part was neurovascularly unchanged following the procedure.  Patient tolerance: Patient tolerated the procedure well with no immediate complications        Labs Reviewed - No data to display       Imaging Results    None          Medications - No data to display  Medical Decision Making  The patient was seen at [facility] on 2/20 after punching a wall. She was diagnosed with a fifth metacarpal fracture and placed in an ulnar gutter splint. She has pain medication. She is here today requesting referral to the [facility] ortho clinic.    Referral sent to ortho clinic.    Amount and/or Complexity of Data Reviewed  External Data Reviewed: radiology.     Details: EXAMINATION:  XR HAND COMPLETE 3 VIEW RIGHT      CLINICAL HISTORY:  ; pain;.     COMPARISON:  None available.     FINDINGS:  AP, lateral, and oblique views revealmildly comminuted angulated displaced oblique fracture at the 5th metacarpal shaft/neck.  No other fracture or dislocation is seen.  Joint spaces appear grossly intact.     Impression:     1. Mildly comminuted displaced angulated oblique fracture at the 5th the metacarpal shaft/neck        Electronically signed by:James Jerome  Date:                                            02/20/2025  Time:                                           15:18        Additional MDM:   Differential Diagnosis:   At this time differential diagnosis is but not limited to fracture, sprain, contusion, arthritis              ED Course as of 02/24/25 0736   Mon Feb 24, 2025   0733 Given strict ED return precautions. I have spoken with the patient and/or caregivers. I have explained the patient's condition, diagnoses and treatment plan based on the information available to me at this time. I have answered the patient's and/or caregiver's questions and addressed any concerns. The patient and/or caregivers have as good an understanding of the patient's diagnosis, condition and treatment plan as can be expected at this point. The vital signs have been stable. The patient's condition is stable and appropriate for discharge from the emergency department.      The patient will pursue further outpatient evaluation with the primary care physician or other designated or consulting physician as outlined in the discharge instructions. The patient and/or caregivers are agreeable to this plan of care and follow-up instructions have been explained in detail. The patient and/or caregivers have received these instructions in written format and have expressed an understanding of the discharge instructions. The patient and/or caregivers are aware that any significant change in condition or worsening of symptoms should prompt an immediate return to  this or the closest emergency department or a call to 911.      [RB]      ED Course User Index  [RB] Too Johnson ACNP                           Clinical Impression:  Final diagnoses:  [S62.306Q] Closed displaced fracture of fifth metacarpal bone of right hand with routine healing, subsequent encounter (Primary)          ED Disposition Condition    Discharge Stable          ED Prescriptions    None       Follow-up Information       Follow up With Specialties Details Why Contact Info    referral sent to orthopedic clinic        [facility] Emergency Medicine  If symptoms worsen [address] [facility] [address] [phone]               [1]   Social History  Tobacco Use    Smoking status: Every Day     Types: Vaping w/o nicotine    Smokeless tobacco: Never   Substance Use Topics    Alcohol use: Never    Drug use: Yes     Types: Marijuana     Comment: 3 x weekly        Too Johnson ACNP  02/24/25 0723

## 2025-02-25 ENCOUNTER — TELEPHONE (OUTPATIENT)
Dept: ORTHOPEDICS | Facility: CLINIC | Age: 50
End: 2025-02-25
Payer: MEDICAID

## 2025-02-25 ENCOUNTER — ANESTHESIA EVENT (OUTPATIENT)
Dept: SURGERY | Facility: HOSPITAL | Age: 50
End: 2025-02-25
Payer: MEDICAID

## 2025-02-25 NOTE — TELEPHONE ENCOUNTER
Patient called wanting to get something for pain    Called patient to let her know that we do not rx medication prior to surgery and to take tylenol every 6 hours apply ice for 20 minutes every 2 hours and elevate to help with her pain.    She verbally understood and will do.

## 2025-02-25 NOTE — ANESTHESIA PREPROCEDURE EVALUATION
Amy Hanson is a 49 y.o. female presenting for ORIF, FRACTURE, METACARPAL BONE, FIFTH (Right: Hand) with a history of   -Closed displaced fracture of fifth metacarpal bone of right hand     -HIV (undetected VL 9/2024)  -BIPOLAR D/O/ANXIETY  -SEIZURES (NO RECENT ISSUES)  -HLD  -CHRONIC NECK PAIN  -PSA (HEROIN AND METH; WENT INTO REHAB 8/2023)/MARIJUANA USE    -OD ON HEROIN 7/2/23  -H/O T2DM (A1C 5.1 9/2024)  -VAPES  -obesity, BMI 34     BETA-BLOCKER: NONE     New Orders for Anesthesia: UPT    Active Ambulatory Problems     Diagnosis Date Noted    Anxiety 12/17/2022    Bipolar I disorder with depression 12/17/2022    Depressive disorder 12/17/2022    Seizure 12/17/2022    Hyperlipidemia 12/17/2022    HIV infection 12/17/2022    Back pain 12/17/2022    HPV (human papilloma virus) anogenital infection 12/17/2022    Chronic neck pain 12/17/2022     Resolved Ambulatory Problems     Diagnosis Date Noted    No Resolved Ambulatory Problems     Past Medical History:   Diagnosis Date    Anemia     Bipolar disorder     Depression     Mitral valve prolapse     Seizures        Pre-op Assessment    I have reviewed the NPO Status.      Review of Systems  Anesthesia Hx:  No problems with previous Anesthesia                Social:  Recreational Drugs, Vaping       Cardiovascular:                hyperlipidemia                               Pulmonary:  Pulmonary Normal                       Renal/:  Renal/ Normal                 Hepatic/GI:  Hepatic/GI Normal                    Neurological:       Seizures, well controlled                                Endocrine:  Diabetes, well controlled, type 2         Obesity / BMI > 30  Psych:  Psychiatric History anxiety               Vitals:    02/27/25 0555 02/27/25 0614 02/27/25 0718   BP: 136/85  (!) 148/96   BP Location:   Left arm   Patient Position:   Lying   Pulse: 66  82   Resp:   16   Temp: 36.9 °C (98.5 °F)  36.7 °C (98.1 °F)   TempSrc: Oral  Tympanic   SpO2: 99%  100%    Weight:  79.9 kg (176 lb 3.2 oz)          Physical Exam  General: Alert, Cooperative and Well nourished    Airway:  Mallampati: II   Mouth Opening: Normal  TM Distance: Normal  Tongue: Normal  Neck ROM: Normal ROM    Dental:  Dentures    Chest/Lungs:  Clear to auscultation, Normal Respiratory Rate    Heart:  Rate: Normal  Rhythm: Regular Rhythm  Sounds: Normal       Latest Reference Range & Units 02/27/25 06:01   hCG Qualitative, Urine Negative  Negative     Lab Results   Component Value Date    WBC 7.30 09/18/2024    HGB 12.9 09/18/2024    HCT 39.7 09/18/2024    MCV 90.2 09/18/2024     09/18/2024       CMP  Sodium   Date Value Ref Range Status   09/18/2024 140 136 - 145 mmol/L Final     Potassium   Date Value Ref Range Status   09/18/2024 3.8 3.5 - 5.1 mmol/L Final     Chloride   Date Value Ref Range Status   09/18/2024 105 98 - 107 mmol/L Final     CO2   Date Value Ref Range Status   09/18/2024 26 22 - 29 mmol/L Final     Blood Urea Nitrogen   Date Value Ref Range Status   09/18/2024 5.1 (L) 7.0 - 18.7 mg/dL Final     Creatinine   Date Value Ref Range Status   09/18/2024 0.79 0.55 - 1.02 mg/dL Final     Calcium   Date Value Ref Range Status   09/18/2024 10.1 8.4 - 10.2 mg/dL Final     Albumin   Date Value Ref Range Status   09/18/2024 4.0 3.5 - 5.0 g/dL Final     Bilirubin Total   Date Value Ref Range Status   09/18/2024 0.4 <=1.5 mg/dL Final     ALP   Date Value Ref Range Status   09/18/2024 88 40 - 150 unit/L Final     AST   Date Value Ref Range Status   09/18/2024 14 5 - 34 unit/L Final     ALT   Date Value Ref Range Status   09/18/2024 10 0 - 55 unit/L Final     eGFR   Date Value Ref Range Status   09/18/2024 >60 mL/min/1.73/m2 Final               Anesthesia Plan  Type of Anesthesia, risks & benefits discussed:    Anesthesia Type: Gen Supraglottic Airway  Intra-op Monitoring Plan: Standard ASA Monitors  Post Op Pain Control Plan: IV/PO Opioids PRN  Induction:  IV  Airway Plan: Direct  Informed  Consent: Informed consent signed with the Patient and all parties understand the risks and agree with anesthesia plan.  All questions answered.   ASA Score: 3  Day of Surgery Review of History & Physical: H&P Update referred to the surgeon/provider.    Ready For Surgery From Anesthesia Perspective.     .

## 2025-02-26 ENCOUNTER — PATIENT MESSAGE (OUTPATIENT)
Dept: SURGERY | Facility: HOSPITAL | Age: 50
End: 2025-02-26
Payer: MEDICAID

## 2025-02-26 PROBLEM — S62.306D: Status: ACTIVE | Noted: 2025-02-26

## 2025-02-26 RX ORDER — MELOXICAM 7.5 MG/1
7.5 TABLET ORAL DAILY
Qty: 7 TABLET | Refills: 0 | Status: SHIPPED | OUTPATIENT
Start: 2025-02-26

## 2025-02-26 RX ORDER — GABAPENTIN 300 MG/1
300 CAPSULE ORAL 3 TIMES DAILY
Qty: 42 CAPSULE | Refills: 0 | Status: SHIPPED | OUTPATIENT
Start: 2025-02-26 | End: 2025-03-12

## 2025-02-26 RX ORDER — ACETAMINOPHEN 500 MG
500 TABLET ORAL EVERY 8 HOURS PRN
Qty: 21 TABLET | Refills: 0 | Status: SHIPPED | OUTPATIENT
Start: 2025-02-26

## 2025-02-26 RX ORDER — OXYCODONE HYDROCHLORIDE 5 MG/1
5 TABLET ORAL EVERY 6 HOURS PRN
Qty: 28 TABLET | Refills: 0 | Status: SHIPPED | OUTPATIENT
Start: 2025-02-26 | End: 2025-03-05

## 2025-02-26 RX ORDER — ONDANSETRON 4 MG/1
4 TABLET, FILM COATED ORAL 2 TIMES DAILY PRN
Qty: 14 TABLET | Refills: 0 | Status: SHIPPED | OUTPATIENT
Start: 2025-02-26 | End: 2025-03-05

## 2025-02-26 RX ORDER — CYCLOBENZAPRINE HCL 5 MG
5 TABLET ORAL 3 TIMES DAILY PRN
Qty: 30 TABLET | Refills: 0 | Status: SHIPPED | OUTPATIENT
Start: 2025-02-26 | End: 2025-03-08

## 2025-02-26 NOTE — DISCHARGE SUMMARY
[facility]  Discharge Note  Short Stay    Procedure(s) (LRB):  ORIF, FRACTURE, METACARPAL BONE, FIFTH (Right)      OUTCOME: Patient tolerated treatment/procedure well without complication and is now ready for discharge.    DISPOSITION: Home or Self Care    FINAL DIAGNOSIS:  Closed displaced fracture of fifth metacarpal bone of right hand with routine healing, subsequent encounter    FOLLOWUP: In clinic    DISCHARGE INSTRUCTIONS:  No discharge procedures on file.

## 2025-02-26 NOTE — H&P
Interval H&P    Patient seen and examined in preop holding area. No changes to history or exam other than noted below.    To OR today for ORIF R fifth [facility]  New Patient Office Visit  02/24/2025         Patient ID: Amy Hanson  YOB: 1975  MRN: [id number]     Diagnosis:     The encounter diagnosis was Closed displaced fracture of fifth metacarpal bone of right hand with routine healing, subsequent encounter.      Chief Complaint: Pain of the Right Hand        Amy Hanson is a 49 y.o. female who presents as a new patient for treatment of the above mentioned diagnosis.  Right-hand dominant, currently not working.  Patient punched her dog on 02/19/2025 sustained a right 5th metacarpal fracture.  She went to an outside facility was placed in a splint after x-rays showed the fracture.  She is here for follow up.  She does have HIV viral load undetectable.           Past Medical History:          Past Medical History:   Diagnosis Date    Anemia      Bipolar disorder      Depression      HIV infection      Mitral valve prolapse      Seizures              Past Surgical History:   Procedure Laterality Date    EXCISION OF SOFT TISSUE Bilateral 4/3/2024     Procedure: EXCISION, SOFT TISSUE, Bilateral forearms;  Surgeon: Deepak Arevalo Jr., MD;  Location: [facility];  Service: General;  Laterality: Bilateral;  Likely MAC to avoid needle skins, patient is HIV+    FACIAL FRACTURE SURGERY        MOUTH SURGERY   03/08/2024    TUBAL LIGATION                 Family History   Problem Relation Name Age of Onset    Heart disease Mother        Heart attack Mother        Hypertension Father        COPD Sister        Intellectual disability Sister          [Social History]    [Social History]        Socioeconomic History    Marital status:    Tobacco Use    Smoking status: Every Day       Types: Vaping w/o nicotine    Smokeless tobacco: Never   Substance and Sexual  Activity    Alcohol use: Never    Drug use: Yes       Types: Marijuana       Comment: 3 x weekly    Sexual activity: Not Currently       Partners: Male       Birth control/protection: Condom          Medication List with Changes/Refills   Current Medications     [id number]-[id number]-YGVLYQT ALA (BIKTARVY) -25 MG (25 KG OR GREATER)    Take 1 tablet by mouth once daily.     CYANOCOBALAMIN (VITAMIN B-12) 100 MCG TABLET    Take 100 mcg by mouth.     DIVALPROEX ER (DEPAKOTE) 500 MG TB24    Take 500 mg by mouth 3 (three) times daily.     DORAVIRINE 100 MG TAB    Take 1 tablet (100 mg total) by mouth once daily. Take together with Biktarvy.     ERGOCALCIFEROL (ERGOCALCIFEROL) 50,000 UNIT CAP    Take 1 capsule (50,000 Units total) by mouth every 7 days.     FLUOXETINE 40 MG CAPSULE    Take 40 mg by mouth.     HYDROCODONE-ACETAMINOPHEN (NORCO) 5-325 MG PER TABLET    Take 1 tablet by mouth every 6 (six) hours as needed for Pain.     HYDROCODONE-ACETAMINOPHEN (NORCO) 5-325 MG PER TABLET    Take 1 tablet by mouth every 6 (six) hours as needed for Pain.     KETOROLAC (TORADOL) 10 MG TABLET    Take 1 tablet (10 mg total) by mouth every 6 (six) hours. for 5 days     QUETIAPINE (SEROQUEL) 300 MG TAB    Take 600 mg by mouth nightly.     SENNA-DOCUSATE 8.6-50 MG (SENNA WITH DOCUSATE SODIUM) 8.6-50 MG PER TABLET    Take 1 tablet by mouth once daily.      Review of patient's allergies indicates:  No Known Allergies     ROS:     Body mass index is 34.37 kg/m².  GENERAL: Well appearing, appropriate for stated age, no acute distress.  CARDIOVASCULAR: Pulses regular by peripheral palpation.  PULMONARY: Respirations are even and non-labored.  NEURO: Awake, alert, and oriented x 3.  PSYCH: Mood & affect are appropriate.  HEENT: Head is normocephalic and atraumatic.     Physical Exam:     Right upper extremity   No open wounds or abrasions   Ecchymosis around the 5th metacarpal   Prominence over the 5th metacarpal fracture site    Tenderness over the 5th metacarpal fracture   Motor intact AIN/PIN/ulnar   [place] M/U/R   Hand warm well perfused  Scissoring of the 5th digit with attempted flexion     Imaging:  X-rays of the right hand obtained and interpreted today demonstrate displaced fracture of the 5th metacarpal        Assessment and Plan:     Amy Hanson is a 49 y.o. female seen in the office today for right 5th metacarpal fracture sustained 02/19/2025     Given the displacement and scissoring on exam we would recommend ORIF of the right 5th metacarpal.  Risks, benefits, and alternatives discussed including but not limited to infection, bleeding, damage to surrounding structures, need for further surgery, hardware complications, wound complications, continued pain and stiffness, malrotation, nonunion or malunion.  Patient would like to proceed with ORIF of the right 5th metacarpal.  We will schedule her for 02/27/2025.  We will place her back in a splint today.            Orders Placed This Encounter    X-Ray Hand Complete Right    Diet NPO    Cleanse with Chlorhexidine (CHG)    Place MIGUEL hose    Place sequential compression device    IP VTE LOW RISK PATIENT    Case Request Operating Room: ORIF, FRACTURE, METACARPAL BONE, FIFTH

## 2025-02-27 ENCOUNTER — ANESTHESIA (OUTPATIENT)
Dept: SURGERY | Facility: HOSPITAL | Age: 50
End: 2025-02-27
Payer: MEDICAID

## 2025-02-27 ENCOUNTER — HOSPITAL ENCOUNTER (OUTPATIENT)
Facility: HOSPITAL | Age: 50
Discharge: HOME | End: 2025-02-27
Attending: ORTHOPAEDIC SURGERY | Admitting: ORTHOPAEDIC SURGERY
Payer: MEDICAID

## 2025-02-27 VITALS
OXYGEN SATURATION: 94 % | TEMPERATURE: 98 F | SYSTOLIC BLOOD PRESSURE: 124 MMHG | RESPIRATION RATE: 16 BRPM | DIASTOLIC BLOOD PRESSURE: 88 MMHG | HEART RATE: 81 BPM | WEIGHT: 176.19 LBS | BODY MASS INDEX: 34.41 KG/M2

## 2025-02-27 DIAGNOSIS — S62.306D: Primary | ICD-10-CM

## 2025-02-27 LAB
B-HCG UR QL: NEGATIVE
CTP QC/QA: YES

## 2025-02-27 PROCEDURE — 71000033 HC RECOVERY, INTIAL HOUR: Performed by: ORTHOPAEDIC SURGERY

## 2025-02-27 PROCEDURE — 63600175 PHARM REV CODE 636 W HCPCS: Performed by: ANESTHESIOLOGY

## 2025-02-27 PROCEDURE — 26418 REPAIR FINGER TENDON: CPT | Mod: 51,RT,, | Performed by: ORTHOPAEDIC SURGERY

## 2025-02-27 PROCEDURE — 36000709 HC OR TIME LEV III EA ADD 15 MIN: Performed by: ORTHOPAEDIC SURGERY

## 2025-02-27 PROCEDURE — 36000708 HC OR TIME LEV III 1ST 15 MIN: Performed by: ORTHOPAEDIC SURGERY

## 2025-02-27 PROCEDURE — 81025 URINE PREGNANCY TEST: CPT | Performed by: NURSE PRACTITIONER

## 2025-02-27 PROCEDURE — C1713 ANCHOR/SCREW BN/BN,TIS/BN: HCPCS | Performed by: ORTHOPAEDIC SURGERY

## 2025-02-27 PROCEDURE — 71000015 HC POSTOP RECOV 1ST HR: Performed by: ORTHOPAEDIC SURGERY

## 2025-02-27 PROCEDURE — C1769 GUIDE WIRE: HCPCS | Performed by: ORTHOPAEDIC SURGERY

## 2025-02-27 PROCEDURE — 63600175 PHARM REV CODE 636 W HCPCS: Performed by: NURSE ANESTHETIST, CERTIFIED REGISTERED

## 2025-02-27 PROCEDURE — 26615 TREAT METACARPAL FRACTURE: CPT | Mod: RT,,, | Performed by: ORTHOPAEDIC SURGERY

## 2025-02-27 PROCEDURE — 37000009 HC ANESTHESIA EA ADD 15 MINS: Performed by: ORTHOPAEDIC SURGERY

## 2025-02-27 PROCEDURE — 37000008 HC ANESTHESIA 1ST 15 MINUTES: Performed by: ORTHOPAEDIC SURGERY

## 2025-02-27 PROCEDURE — 63600175 PHARM REV CODE 636 W HCPCS: Performed by: STUDENT IN AN ORGANIZED HEALTH CARE EDUCATION/TRAINING PROGRAM

## 2025-02-27 DEVICE — IMPLANTABLE DEVICE: Type: IMPLANTABLE DEVICE | Site: FINGER | Status: FUNCTIONAL

## 2025-02-27 RX ORDER — SODIUM CHLORIDE, SODIUM LACTATE, POTASSIUM CHLORIDE, CALCIUM CHLORIDE 600; 310; 30; 20 MG/100ML; MG/100ML; MG/100ML; MG/100ML
INJECTION, SOLUTION INTRAVENOUS CONTINUOUS
Status: DISCONTINUED | OUTPATIENT
Start: 2025-02-27 | End: 2025-02-27 | Stop reason: HOSPADM

## 2025-02-27 RX ORDER — KETOROLAC TROMETHAMINE 30 MG/ML
INJECTION, SOLUTION INTRAMUSCULAR; INTRAVENOUS
Status: DISCONTINUED | OUTPATIENT
Start: 2025-02-27 | End: 2025-02-27

## 2025-02-27 RX ORDER — CEFAZOLIN SODIUM 1 G/3ML
2 INJECTION, POWDER, FOR SOLUTION INTRAMUSCULAR; INTRAVENOUS
Status: COMPLETED | OUTPATIENT
Start: 2025-02-27 | End: 2025-02-27

## 2025-02-27 RX ORDER — SODIUM CHLORIDE 0.9 % (FLUSH) 0.9 %
10 SYRINGE (ML) INJECTION
Status: DISCONTINUED | OUTPATIENT
Start: 2025-02-27 | End: 2025-02-27 | Stop reason: HOSPADM

## 2025-02-27 RX ORDER — MUPIROCIN 20 MG/G
OINTMENT TOPICAL
Status: DISCONTINUED | OUTPATIENT
Start: 2025-02-27 | End: 2025-02-27 | Stop reason: HOSPADM

## 2025-02-27 RX ORDER — MIDAZOLAM HYDROCHLORIDE 2 MG/2ML
2 INJECTION, SOLUTION INTRAMUSCULAR; INTRAVENOUS ONCE AS NEEDED
Status: COMPLETED | OUTPATIENT
Start: 2025-02-27 | End: 2025-02-27

## 2025-02-27 RX ORDER — SODIUM CHLORIDE 9 MG/ML
INJECTION, SOLUTION INTRAVENOUS CONTINUOUS
Status: DISCONTINUED | OUTPATIENT
Start: 2025-02-27 | End: 2025-02-27 | Stop reason: HOSPADM

## 2025-02-27 RX ORDER — PROMETHAZINE HYDROCHLORIDE 25 MG/ML
12.5 INJECTION, SOLUTION INTRAMUSCULAR; INTRAVENOUS ONCE
Status: COMPLETED | OUTPATIENT
Start: 2025-02-27 | End: 2025-02-27

## 2025-02-27 RX ORDER — MORPHINE SULFATE 2 MG/ML
2 INJECTION, SOLUTION INTRAMUSCULAR; INTRAVENOUS EVERY 5 MIN PRN
Status: DISCONTINUED | OUTPATIENT
Start: 2025-02-27 | End: 2025-02-27 | Stop reason: HOSPADM

## 2025-02-27 RX ORDER — GLUCAGON 1 MG
1 KIT INJECTION
Status: DISCONTINUED | OUTPATIENT
Start: 2025-02-27 | End: 2025-02-27 | Stop reason: HOSPADM

## 2025-02-27 RX ORDER — ONDANSETRON HYDROCHLORIDE 2 MG/ML
INJECTION, SOLUTION INTRAMUSCULAR; INTRAVENOUS
Status: DISCONTINUED | OUTPATIENT
Start: 2025-02-27 | End: 2025-02-27

## 2025-02-27 RX ORDER — FENTANYL CITRATE 50 UG/ML
INJECTION, SOLUTION INTRAMUSCULAR; INTRAVENOUS
Status: DISCONTINUED | OUTPATIENT
Start: 2025-02-27 | End: 2025-02-27

## 2025-02-27 RX ORDER — LIDOCAINE HYDROCHLORIDE 20 MG/ML
INJECTION, SOLUTION EPIDURAL; INFILTRATION; INTRACAUDAL; PERINEURAL
Status: DISCONTINUED | OUTPATIENT
Start: 2025-02-27 | End: 2025-02-27

## 2025-02-27 RX ORDER — HYDROMORPHONE HYDROCHLORIDE 1 MG/ML
0.5 INJECTION, SOLUTION INTRAMUSCULAR; INTRAVENOUS; SUBCUTANEOUS EVERY 5 MIN PRN
Refills: 0 | Status: DISCONTINUED | OUTPATIENT
Start: 2025-02-27 | End: 2025-02-27 | Stop reason: HOSPADM

## 2025-02-27 RX ORDER — DEXAMETHASONE SODIUM PHOSPHATE 4 MG/ML
INJECTION, SOLUTION INTRA-ARTICULAR; INTRALESIONAL; INTRAMUSCULAR; INTRAVENOUS; SOFT TISSUE
Status: DISCONTINUED | OUTPATIENT
Start: 2025-02-27 | End: 2025-02-27

## 2025-02-27 RX ORDER — GLYCOPYRROLATE 0.2 MG/ML
INJECTION INTRAMUSCULAR; INTRAVENOUS
Status: DISCONTINUED | OUTPATIENT
Start: 2025-02-27 | End: 2025-02-27

## 2025-02-27 RX ORDER — OXYCODONE HYDROCHLORIDE 5 MG/1
5 TABLET ORAL
Status: DISCONTINUED | OUTPATIENT
Start: 2025-02-27 | End: 2025-02-27 | Stop reason: HOSPADM

## 2025-02-27 RX ORDER — ONDANSETRON HYDROCHLORIDE 2 MG/ML
4 INJECTION, SOLUTION INTRAVENOUS DAILY PRN
Status: DISCONTINUED | OUTPATIENT
Start: 2025-02-27 | End: 2025-02-27 | Stop reason: HOSPADM

## 2025-02-27 RX ORDER — MEPERIDINE HYDROCHLORIDE 25 MG/ML
12.5 INJECTION INTRAMUSCULAR; INTRAVENOUS; SUBCUTANEOUS EVERY 10 MIN PRN
Status: DISCONTINUED | OUTPATIENT
Start: 2025-02-27 | End: 2025-02-27 | Stop reason: HOSPADM

## 2025-02-27 RX ORDER — PROPOFOL 10 MG/ML
VIAL (ML) INTRAVENOUS
Status: DISCONTINUED | OUTPATIENT
Start: 2025-02-27 | End: 2025-02-27

## 2025-02-27 RX ADMIN — PROMETHAZINE HYDROCHLORIDE 12.5 MG: 25 INJECTION INTRAMUSCULAR; INTRAVENOUS at 10:02

## 2025-02-27 RX ADMIN — MIDAZOLAM HYDROCHLORIDE 2 MG: 1 INJECTION, SOLUTION INTRAMUSCULAR; INTRAVENOUS at 07:02

## 2025-02-27 RX ADMIN — KETOROLAC TROMETHAMINE 30 MG: 30 INJECTION, SOLUTION INTRAMUSCULAR at 09:02

## 2025-02-27 RX ADMIN — FENTANYL CITRATE 100 MCG: 50 INJECTION, SOLUTION INTRAMUSCULAR; INTRAVENOUS at 09:02

## 2025-02-27 RX ADMIN — GLYCOPYRROLATE 0.2 MG: 0.2 INJECTION INTRAMUSCULAR; INTRAVENOUS at 09:02

## 2025-02-27 RX ADMIN — DEXAMETHASONE SODIUM PHOSPHATE 8 MG: 4 INJECTION, SOLUTION INTRA-ARTICULAR; INTRALESIONAL; INTRAMUSCULAR; INTRAVENOUS; SOFT TISSUE at 09:02

## 2025-02-27 RX ADMIN — HYDROMORPHONE HYDROCHLORIDE 0.5 MG: 1 INJECTION, SOLUTION INTRAMUSCULAR; INTRAVENOUS; SUBCUTANEOUS at 10:02

## 2025-02-27 RX ADMIN — MORPHINE SULFATE 2 MG: 2 INJECTION, SOLUTION INTRAMUSCULAR; INTRAVENOUS at 10:02

## 2025-02-27 RX ADMIN — CEFAZOLIN 2 G: 330 INJECTION, POWDER, FOR SOLUTION INTRAMUSCULAR; INTRAVENOUS at 09:02

## 2025-02-27 RX ADMIN — LIDOCAINE HYDROCHLORIDE 50 MG: 20 INJECTION, SOLUTION EPIDURAL; INFILTRATION; INTRACAUDAL; PERINEURAL at 09:02

## 2025-02-27 RX ADMIN — ONDANSETRON 4 MG: 2 INJECTION INTRAMUSCULAR; INTRAVENOUS at 09:02

## 2025-02-27 RX ADMIN — PROPOFOL 200 MG: 10 INJECTION, EMULSION INTRAVENOUS at 09:02

## 2025-02-27 NOTE — PROGRESS NOTES
Faculty Attestation  Preop Dx: R 5th mc fx  Plan: OR  I agree with the resident's History & Physical.  Proceed with case.    Ifeanyi Ryder  Orthopaedic Surgery

## 2025-02-27 NOTE — PROGRESS NOTES
Faculty Attestation: I was present and scrubbed throughout the key elements of the procedure.  I agree with the resident's findings and description.    Ifeanyi Ryder  Orthopaedic Surgery

## 2025-02-27 NOTE — OP NOTE
[facility]  Surgery Department  Operative Note    SUMMARY     Date of Procedure: 2/27/2025     Procedure: Procedure(s) (LRB):  ORIF, FRACTURE, METACARPAL BONE, FIFTH (Right)   Extensor tendon repair     Surgeons and Role:     * Ifeanyi Ryder MD - Primary     * Ifeanyi Beauchamp MD - Resident - Assisting        Pre-Operative Diagnosis:  Right 5th metacarpal fracture    Post-Operative Diagnosis: Post-Op Diagnosis Codes:     * Closed displaced fracture of fifth metacarpal bone of right hand with routine healing, subsequent encounter [S62.306D]    Anesthesia: General    Operative Findings (including complications, if any):  Right 5th metacarpal fracture    Description of Technical Procedures:  Patient was taken to the OR and placed supine on the stretcher.  The right upper extremity was prepped and draped in normal sterile fashion.  A time-out was held in the correct patient, extremity, and procedure were confirmed.  Preoperative ABX were given.  The wound was exsanguinated and tourniquet was inflated to 250 mmHg.      Closed reduction of the fracture was attempted under fluoroscopy.  We are unable to satisfactorily get it reduced into anatomic alignment.  We then made an incision directly over the fracture site.  Blunt dissection was performed in the discussed her tendons were identified.  We noticed that there was a horizontal rupture of the most ulnar extensor tendon from the injury.  We identified the fracture and cleaned it.  We then provisionally reduced the fracture with a pointed reduction clamp and confirmed reduction on imaging.  We then placed our starting wire into the head of the metacarpal centered on the AP and in the dorsal 1/3 on the lateral image.  We drilled the wire down the metacarpal and then measured.  We then over-drilled the wire and placed an exosmed 4.5 x 40 mm screw into the 5th metacarpal holding appropriate reduction and rotation.  We confirmed rotation was  Spoke with patient and verbalized understanding.   appropriate and final x-rays confirmed appropriate reduction and placement of the hardware.  We repaired the ruptured extensor tendon with 4-0 nylon suture in a figure of eight fashion .  The wound was copiously irrigated.  The wound was then closed with 3-0 Vicryl and 3-0 nylon suture.  A sterile dressing with Xeroform 4x4s cast padding and an ulnar gutter slab splint was placed.      Estimated Blood Loss (EBL): 5 ml           Implants:   Implant Name Type Inv. Item Serial No.  Lot No. LRB No. Used Action   KIT INNATE INSTRUMENT 4.5MM - [id number]  KIT INNATE INSTRUMENT 4.5MM  [employer] [id number] Right 1 Implanted and Explanted   IMPLANT INNATE 4.5X40MM - [id number]  IMPLANT INNATE 4.5X40MM  [employer] [id number] Right 1 Implanted       Specimens:   Specimen (24h ago, onward)      None                    Condition: Good    Disposition: PACU - hemodynamically stable.    Plan   Follow up 2 weeks for wound check and suture removal  Refer to OT at that time to get a custom splint made for the extensor tendon repair per [facility] guidelines   MEI Beauchamp

## 2025-02-27 NOTE — DISCHARGE INSTRUCTIONS
Keep follow up appointment at [facility].     · Take pain medication as prescribed.     · Keep arm elevated on pillow.     · No driving or consuming alcohol for the next 24 hours or while taking narcotic pain medicine.     · May apply ice pack to surgical area for 20 minutes at a time 6-8 times per day.     · Keep dressing clean, intact and dry till clinic visit.     - Notify MD of any moderate to severe pain unrelieved by pain medicine or for any signs of infection including fever above 100.4, excessive redness or swelling, yellow/green foul- smelling drainage, nausea or vomiting. Call clinic at: [phone]. After business hours, if you are unable to reach a doctor on call at [phone] or your concern is an emergency, call [phone] or report to your nearest emergency room.     ·  Thanks for choosing [facility]! Have a smooth recovery!

## 2025-02-27 NOTE — ANESTHESIA POSTPROCEDURE EVALUATION
Anesthesia Post Evaluation    Patient: Amy Hanson    Procedure(s) Performed: Procedure(s) (LRB):  ORIF, FRACTURE, METACARPAL BONE, FIFTH (Right)    Final Anesthesia Type: general      Patient location during evaluation: DOSC  Post-procedure vital signs: reviewed and stable  Airway patency: patent      Anesthetic complications: no      Cardiovascular status: hemodynamically stable  Respiratory status: spontaneous ventilation  Follow-up not needed.              Vitals Value Taken Time   /91 02/27/25 11:31   Temp 36.8 °C (98.2 °F) 02/27/25 10:40   Pulse 96 02/27/25 11:33   Resp 16 02/27/25 11:20   SpO2 98 % 02/27/25 11:33   Vitals shown include unfiled device data.      Event Time   Out of Recovery 10:38:00         Pain/Chaz Score: Pain Rating Prior to Med Admin: 8 (2/27/2025 10:33 AM)  Chaz Score: 10 (2/27/2025 10:40 AM)  Modified Chaz Score: 19 (2/27/2025 11:37 AM)

## 2025-02-27 NOTE — ANESTHESIA PROCEDURE NOTES
Intubation    Date/Time: 2/27/2025 9:09 AM    Performed by: Juan David Coley CRNA  Authorized by: Isha Martinez MD    Intubation:     Induction:  Intravenous    Intubated:  Postinduction    Mask Ventilation:  Easy mask    Attempts:  1    Attempted By:  CRNA    Difficult Airway Encountered?: No      Complications:  None    Airway Device:  Supraglottic airway/LMA    Airway Device Size:  3.0    Placement Verified By:  Capnometry    Complicating Factors:  None    Findings Post-Intubation:  BS equal bilateral

## 2025-03-03 ENCOUNTER — TELEPHONE (OUTPATIENT)
Dept: ORTHOPEDICS | Facility: CLINIC | Age: 50
End: 2025-03-03
Payer: MEDICAID

## 2025-03-03 DIAGNOSIS — S62.306D: Primary | ICD-10-CM

## 2025-03-03 RX ORDER — MELOXICAM 15 MG/1
15 TABLET ORAL DAILY
Qty: 30 TABLET | Refills: 0 | Status: SHIPPED | OUTPATIENT
Start: 2025-03-03 | End: 2025-03-03

## 2025-03-03 RX ORDER — MELOXICAM 15 MG/1
15 TABLET ORAL DAILY
Qty: 30 TABLET | Refills: 0 | Status: SHIPPED | OUTPATIENT
Start: 2025-03-03

## 2025-03-03 RX ORDER — OXYCODONE HYDROCHLORIDE 5 MG/1
5 TABLET ORAL EVERY 4 HOURS PRN
Qty: 25 TABLET | Refills: 0 | Status: SHIPPED | OUTPATIENT
Start: 2025-03-03 | End: 2025-03-03

## 2025-03-03 RX ORDER — OXYCODONE HYDROCHLORIDE 5 MG/1
5 TABLET ORAL EVERY 4 HOURS PRN
Qty: 25 TABLET | Refills: 0 | Status: SHIPPED | OUTPATIENT
Start: 2025-03-03 | End: 2025-03-10

## 2025-03-03 NOTE — TELEPHONE ENCOUNTER
Patient called stating that the pain medication is not helping with her pain at all and needs something else for pain.  She is taking the pain medication every 4 hours as you told her and it is not helping.    She states she is elevating the hand and icing it but it is not helping.    Can you please send her something else in?

## 2025-03-03 NOTE — TELEPHONE ENCOUNTER
Patient called stating that she is still in a lot of pain and the pain meds that was given to her is not working, she states that she is taking it every 6 hours so it can stretch but its not helping. Patient is elevating as directed.Is there anything else you can give her for pain, her surgery was on 02/27/2025

## 2025-03-12 ENCOUNTER — OFFICE VISIT (OUTPATIENT)
Dept: ORTHOPEDICS | Facility: CLINIC | Age: 50
End: 2025-03-12
Payer: MEDICAID

## 2025-03-12 ENCOUNTER — HOSPITAL ENCOUNTER (OUTPATIENT)
Dept: RADIOLOGY | Facility: HOSPITAL | Age: 50
Discharge: HOME | End: 2025-03-12
Payer: MEDICAID

## 2025-03-12 VITALS
HEIGHT: 60 IN | SYSTOLIC BLOOD PRESSURE: 106 MMHG | TEMPERATURE: 98 F | RESPIRATION RATE: 17 BRPM | BODY MASS INDEX: 34.71 KG/M2 | OXYGEN SATURATION: 97 % | HEART RATE: 107 BPM | DIASTOLIC BLOOD PRESSURE: 75 MMHG | WEIGHT: 176.81 LBS

## 2025-03-12 DIAGNOSIS — M79.641 PAIN OF RIGHT HAND: ICD-10-CM

## 2025-03-12 DIAGNOSIS — S62.306D: Primary | ICD-10-CM

## 2025-03-12 PROCEDURE — 73130 X-RAY EXAM OF HAND: CPT | Mod: TC,RT

## 2025-03-12 PROCEDURE — 99214 OFFICE O/P EST MOD 30 MIN: CPT | Mod: PBBFAC,25

## 2025-03-12 RX ORDER — ROPINIROLE 1 MG/1
1 TABLET, FILM COATED ORAL NIGHTLY
COMMUNITY
Start: 2025-01-17

## 2025-03-12 NOTE — PROGRESS NOTES
[facility]  New Patient Office Visit  03/12/2025       Patient ID: Amy Hanson  YOB: 1975  MRN: [id number]    Diagnosis:    The primary encounter diagnosis was Closed displaced fracture of fifth metacarpal bone of right hand with routine healing, subsequent encounter. A diagnosis of Pain of right hand was also pertinent to this visit.     Chief Complaint: Post-op Evaluation of the Right Hand      Amy Hanson is a 49 y.o. female who presents as a new patient for treatment of the above mentioned diagnosis.  Right-hand dominant, currently not working.  Patient punched her dog on 02/19/2025 sustained a right 5th metacarpal fracture.  She went to an outside facility was placed in a splint after x-rays showed the fracture.  She is here for follow up.  She does have HIV viral load undetectable.    03/12/2025.  On February 27, 2025 she underwent a rodding of her 5th metacarpal.  Having some discomfort but not severe.  A fair amount of stiffness in the hand.        Past Medical History:    Past Medical History:   Diagnosis Date    Anemia     Bipolar disorder     Depression     HIV infection     Mitral valve prolapse     Seizures      Past Surgical History:   Procedure Laterality Date    EXCISION OF SOFT TISSUE Bilateral 4/3/2024    Procedure: EXCISION, SOFT TISSUE, Bilateral forearms;  Surgeon: Deepak Arevalo Jr., MD;  Location: [facility];  Service: General;  Laterality: Bilateral;  Likely MAC to avoid needle skins, patient is HIV+    FACIAL FRACTURE SURGERY      MOUTH SURGERY  03/08/2024    OPEN REDUCTION AND INTERNAL FIXATION (ORIF) OF FRACTURE OF METACARPAL BONE Right 2/27/2025    Procedure: ORIF, FRACTURE, METACARPAL BONE, FIFTH;  Surgeon: Ifeanyi Ryder MD;  Location: [facility];  Service: Orthopedics;  Laterality: Right;    TUBAL LIGATION       Family History   Problem Relation Name Age of Onset    Heart disease Mother      Heart attack Mother      Hypertension  Father      COPD Sister      Intellectual disability Sister       Social History[1]  Medication List with Changes/Refills   Current Medications    ACETAMINOPHEN (TYLENOL) 500 MG TABLET    Take 1 tablet (500 mg total) by mouth every 8 (eight) hours as needed for Pain.    RPBVFCLHS-VPHYHQRZ-ZWONALT ALA (BIKTARVY) -25 MG (25 KG OR GREATER)    Take 1 tablet by mouth once daily.    CYANOCOBALAMIN (VITAMIN B-12) 100 MCG TABLET    Take 100 mcg by mouth.    DIVALPROEX ER (DEPAKOTE) 500 MG TB24    Take 500 mg by mouth 3 (three) times daily.    DORAVIRINE 100 MG TAB    Take 1 tablet (100 mg total) by mouth once daily. Take together with Biktarvy.    ERGOCALCIFEROL (ERGOCALCIFEROL) 50,000 UNIT CAP    Take 1 capsule (50,000 Units total) by mouth every 7 days.    FLUOXETINE 40 MG CAPSULE    Take 40 mg by mouth.    GABAPENTIN (NEURONTIN) 300 MG CAPSULE    Take 1 capsule (300 mg total) by mouth 3 (three) times daily. for 14 days    MELOXICAM (MOBIC) 15 MG TABLET    Take 1 tablet (15 mg total) by mouth once daily.    MELOXICAM (MOBIC) 7.5 MG TABLET    Take 1 tablet (7.5 mg total) by mouth once daily.    QUETIAPINE (SEROQUEL) 300 MG TAB    Take 600 mg by mouth nightly.    ROPINIROLE (REQUIP) 1 MG TABLET    Take 1 mg by mouth every evening.    SENNA-DOCUSATE 8.6-50 MG (SENNA WITH DOCUSATE SODIUM) 8.6-50 MG PER TABLET    Take 1 tablet by mouth once daily.     Review of patient's allergies indicates:  No Known Allergies    ROS:    Body mass index is 34.53 kg/m².  GENERAL: Well appearing, appropriate for stated age, no acute distress.  CARDIOVASCULAR: Pulses regular by peripheral palpation.  PULMONARY: Respirations are even and non-labored.  NEURO: Awake, alert, and oriented x 3.  PSYCH: Mood & affect are appropriate.  HEENT: Head is normocephalic and atraumatic.    Physical Exam:    Right 5th metacarpal is stable.  Wound is clean and dry.  Has a significant amount of MP stiffness.    Imaging:  X-rays of the right hand obtained  and interpreted today demonstrate a amanda within the 5th metacarpal.  Traversing the fracture site.  Appears in good alignment.      Assessment and Plan:    We will remove sutures today.  We will send her to therapy.  We will have her follow up in 6 weeks with x-rays of her hand.      Orders Placed This Encounter    X-Ray Hand Complete Right               [1]   Social History  Socioeconomic History    Marital status:    Tobacco Use    Smoking status: Every Day     Types: Vaping w/o nicotine    Smokeless tobacco: Never   Substance and Sexual Activity    Alcohol use: Never    Drug use: Yes     Types: Marijuana     Comment: 3 x weekly    Sexual activity: Not Currently     Partners: Male     Birth control/protection: Condom

## 2025-03-28 ENCOUNTER — TELEPHONE (OUTPATIENT)
Dept: ORTHOPEDICS | Facility: CLINIC | Age: 50
End: 2025-03-28
Payer: MEDICAID

## 2025-03-28 NOTE — TELEPHONE ENCOUNTER
Patient called this morning stating she is having a lot of stiffness in her hand and cannot make a complete fist. I asked her if she started PT and she states she did not know she needed to do therapy. I let her know she needs to do therapy to help with the stiffness. I asked her to contact her insurance, find out who she can go to and let us know. We will fax it when she calls back with the facility.  Please fill out order for me to fax

## 2025-03-31 NOTE — TELEPHONE ENCOUNTER
Spoke to patient this morning about the therapy order. She has not found a facility that takes her insurance as of today but will be calling 2 other ones later today. I let her know the script for Therapy is at the  if she decides to pick it up.

## 2025-04-01 NOTE — TELEPHONE ENCOUNTER
Spoke to patient today to see if she was able to reach the PT facilities in [place] that accepts her medicaid. She said she has an appt with [facility]. I will fax the order

## 2025-04-17 ENCOUNTER — OFFICE VISIT (OUTPATIENT)
Dept: INFECTIOUS DISEASES | Facility: CLINIC | Age: 50
End: 2025-04-17
Payer: MEDICAID

## 2025-04-17 ENCOUNTER — LAB VISIT (OUTPATIENT)
Dept: LAB | Facility: HOSPITAL | Age: 50
End: 2025-04-17
Attending: NURSE PRACTITIONER
Payer: MEDICAID

## 2025-04-17 VITALS
DIASTOLIC BLOOD PRESSURE: 89 MMHG | BODY MASS INDEX: 34.71 KG/M2 | HEIGHT: 60 IN | HEART RATE: 77 BPM | SYSTOLIC BLOOD PRESSURE: 135 MMHG | RESPIRATION RATE: 10 BRPM | WEIGHT: 176.81 LBS | TEMPERATURE: 98 F

## 2025-04-17 DIAGNOSIS — Z12.31 ENCOUNTER FOR SCREENING MAMMOGRAM FOR MALIGNANT NEOPLASM OF BREAST: ICD-10-CM

## 2025-04-17 DIAGNOSIS — Z16.33 DRUG RESISTANCE TO ANTIRETROVIRAL THERAPY: ICD-10-CM

## 2025-04-17 DIAGNOSIS — Z12.11 COLON CANCER SCREENING: ICD-10-CM

## 2025-04-17 DIAGNOSIS — B20 HIV DISEASE: ICD-10-CM

## 2025-04-17 DIAGNOSIS — E55.9 VITAMIN D DEFICIENCY: ICD-10-CM

## 2025-04-17 DIAGNOSIS — B20 HIV DISEASE: Primary | ICD-10-CM

## 2025-04-17 DIAGNOSIS — Z23 NEED FOR VACCINATION: ICD-10-CM

## 2025-04-17 LAB
ALBUMIN SERPL-MCNC: 4.2 G/DL (ref 3.5–5)
ALBUMIN/GLOB SERPL: 1 RATIO (ref 1.1–2)
ALP SERPL-CCNC: 74 UNIT/L (ref 40–150)
ALT SERPL-CCNC: 8 UNIT/L (ref 0–55)
ANION GAP SERPL CALC-SCNC: 8 MEQ/L
AST SERPL-CCNC: 14 UNIT/L (ref 11–45)
BASOPHILS # BLD AUTO: 0.03 X10(3)/MCL
BASOPHILS NFR BLD AUTO: 0.4 %
BILIRUB SERPL-MCNC: 0.4 MG/DL
BUN SERPL-MCNC: 7.6 MG/DL (ref 9.8–20.1)
CALCIUM SERPL-MCNC: 9.5 MG/DL (ref 8.4–10.2)
CHLORIDE SERPL-SCNC: 105 MMOL/L (ref 98–107)
CO2 SERPL-SCNC: 24 MMOL/L (ref 22–29)
CREAT SERPL-MCNC: 0.77 MG/DL (ref 0.55–1.02)
CREAT/UREA NIT SERPL: 10
EOSINOPHIL # BLD AUTO: 0.04 X10(3)/MCL (ref 0–0.9)
EOSINOPHIL NFR BLD AUTO: 0.5 %
ERYTHROCYTE [DISTWIDTH] IN BLOOD BY AUTOMATED COUNT: 13.4 % (ref 11.5–17)
GFR SERPLBLD CREATININE-BSD FMLA CKD-EPI: >60 ML/MIN/1.73/M2
GLOBULIN SER-MCNC: 4.1 GM/DL (ref 2.4–3.5)
GLUCOSE SERPL-MCNC: 96 MG/DL (ref 74–100)
HCT VFR BLD AUTO: 38.9 % (ref 37–47)
HGB BLD-MCNC: 12.9 G/DL (ref 12–16)
IMM GRANULOCYTES # BLD AUTO: 0.02 X10(3)/MCL (ref 0–0.04)
IMM GRANULOCYTES NFR BLD AUTO: 0.2 %
LYMPHOCYTES # BLD AUTO: 3.33 X10(3)/MCL (ref 0.6–4.6)
LYMPHOCYTES NFR BLD AUTO: 38.9 %
MCH RBC QN AUTO: 30 PG (ref 27–31)
MCHC RBC AUTO-ENTMCNC: 33.2 G/DL (ref 33–36)
MCV RBC AUTO: 90.5 FL (ref 80–94)
MONOCYTES # BLD AUTO: 0.42 X10(3)/MCL (ref 0.1–1.3)
MONOCYTES NFR BLD AUTO: 4.9 %
NEUTROPHILS # BLD AUTO: 4.71 X10(3)/MCL (ref 2.1–9.2)
NEUTROPHILS NFR BLD AUTO: 55.1 %
NRBC BLD AUTO-RTO: 0 %
PLATELET # BLD AUTO: 261 X10(3)/MCL (ref 130–400)
PMV BLD AUTO: 10.2 FL (ref 7.4–10.4)
POTASSIUM SERPL-SCNC: 3.8 MMOL/L (ref 3.5–5.1)
PROT SERPL-MCNC: 8.3 GM/DL (ref 6.4–8.3)
RBC # BLD AUTO: 4.3 X10(6)/MCL (ref 4.2–5.4)
SODIUM SERPL-SCNC: 137 MMOL/L (ref 136–145)
WBC # BLD AUTO: 8.55 X10(3)/MCL (ref 4.5–11.5)

## 2025-04-17 PROCEDURE — 80053 COMPREHEN METABOLIC PANEL: CPT

## 2025-04-17 PROCEDURE — 90739 HEPB VACC 2/4 DOSE ADULT IM: CPT | Mod: PBBFAC

## 2025-04-17 PROCEDURE — 90632 HEPA VACCINE ADULT IM: CPT | Mod: PBBFAC

## 2025-04-17 PROCEDURE — 86361 T CELL ABSOLUTE COUNT: CPT

## 2025-04-17 PROCEDURE — 90471 IMMUNIZATION ADMIN: CPT | Mod: PBBFAC

## 2025-04-17 PROCEDURE — 85025 COMPLETE CBC W/AUTO DIFF WBC: CPT

## 2025-04-17 PROCEDURE — 90472 IMMUNIZATION ADMIN EACH ADD: CPT | Mod: PBBFAC

## 2025-04-17 PROCEDURE — 36415 COLL VENOUS BLD VENIPUNCTURE: CPT

## 2025-04-17 PROCEDURE — 99214 OFFICE O/P EST MOD 30 MIN: CPT | Mod: PBBFAC | Performed by: NURSE PRACTITIONER

## 2025-04-17 PROCEDURE — 87536 HIV-1 QUANT&REVRSE TRNSCRPJ: CPT

## 2025-04-17 RX ORDER — DIVALPROEX SODIUM 500 MG/1
TABLET, DELAYED RELEASE ORAL
COMMUNITY
End: 2025-04-17 | Stop reason: SDUPTHER

## 2025-04-17 RX ORDER — QUETIAPINE FUMARATE 400 MG/1
400 TABLET, FILM COATED ORAL 3 TIMES DAILY
COMMUNITY
Start: 2025-04-04 | End: 2025-04-17

## 2025-04-17 RX ORDER — CARIPRAZINE 1.5 MG/1
1.5 CAPSULE, GELATIN COATED ORAL
COMMUNITY
Start: 2025-04-07

## 2025-04-17 RX ORDER — BICTEGRAVIR SODIUM, EMTRICITABINE, AND TENOFOVIR ALAFENAMIDE FUMARATE 50; 200; 25 MG/1; MG/1; MG/1
1 TABLET ORAL DAILY
Qty: 30 TABLET | Refills: 3 | Status: SHIPPED | OUTPATIENT
Start: 2025-04-17

## 2025-04-17 RX ORDER — FLUOXETINE HYDROCHLORIDE 40 MG/1
CAPSULE ORAL
COMMUNITY
End: 2025-04-17 | Stop reason: SDUPTHER

## 2025-04-17 RX ORDER — ERGOCALCIFEROL 1.25 MG/1
50000 CAPSULE ORAL
Qty: 5 CAPSULE | Refills: 3 | Status: SHIPPED | OUTPATIENT
Start: 2025-04-17

## 2025-04-17 RX ADMIN — HEPATITIS A VACCINE 1440 UNITS: 1440 INJECTION, SUSPENSION INTRAMUSCULAR at 09:04

## 2025-04-17 RX ADMIN — HEPATITIS B VACCINE (RECOMBINANT) ADJUVANTED 0.5 ML: 20 INJECTION, SOLUTION INTRAMUSCULAR at 09:04

## 2025-04-17 NOTE — PROGRESS NOTES
Patient ID: Amy Hanson 49 y.o.     Chief Complaint:   Chief Complaint   Patient presents with    Followup HIV     Denies problems        HPI:    4/17/25  Amy is a 50 yo WF presenting today for HIV f/u visit.  She states that she is taking Biktarvy & Pifeltro daily, tolerates well. Labs 9/24 VL UD, CD4 491, RPR NR, urine ct/gc negative. She tells me that she has been clean x 4 months now, with the exception of marijuana. She is feeling much better and plans to stay off all other drugs. She is non-immune to Hep A, immune to Hep B with relatively low titer. Appreciates Hep A vaccine dose #1 today and Hep B booster dose as well. Due for mmg, order placed. She is amenable to cologuard testing for colon cancer screening, order placed. She has no concerns or questions today.     9/18/24  Amy is a 49 yo WF here today for HIV f/u visit, accompanied by her sister. She tells me that she is taking Biktarvy & Pifeltro most days, but does miss one or two days per week.  She tolerates it well. Labs 3/24 VL 42, CD4 342. Will update labs today.  Admits to ongoing drug use, has escalated from heroin to fentanyl. She mentions that she would like help to quit drug use but has no desire to enter a treatment facility. Will provide her with a list of rehab facilities. Declines flu vaccine, appreciates PCV 20 today.  All questions answered & concerns addressed.     3/25/24  Amy is a 49 yo WF presenting today for HIV f/u visit, accompanied by sister. She is taking ART as prescribed, Biktarvy and Pifeltro daily due to MDR virus. She is tolerating it well, no noted side effects and no missed doses. She completed antimicrobials for UTI but is still having some urgency and frequency. Will repeat clean catch UA today to assess for bacterial eradication.  Pap smear NIL. MMG BI-RADS 1. She is taking vitamin D weekly as prescribed. She has an appointment tomorrow with surgery clinic for evaluation of lipoma to forearm. She  remains clean and sober, still residing with sister at this time. All questions answered & concerns addressed.           Past Medical History:   Diagnosis Date    Anemia     Bipolar disorder     Depression     HIV infection     Mitral valve prolapse     Seizures         Past Surgical History:   Procedure Laterality Date    EXCISION OF SOFT TISSUE Bilateral 04/03/2024    Procedure: EXCISION, SOFT TISSUE, Bilateral forearms;  Surgeon: Deepak Arevalo Jr., MD;  Location: [facility];  Service: General;  Laterality: Bilateral;  Likely MAC to avoid needle skins, patient is HIV+    FACIAL FRACTURE SURGERY      MOUTH SURGERY  03/08/2024    OPEN REDUCTION AND INTERNAL FIXATION (ORIF) OF FRACTURE OF METACARPAL BONE Right 02/27/2025    Procedure: ORIF, FRACTURE, METACARPAL BONE, FIFTH;  Surgeon: Ifeanyi Ryder MD;  Location: [facility];  Service: Orthopedics;  Laterality: Right;    TUBAL LIGATION          Social History     Socioeconomic History    Marital status:    Tobacco Use    Smoking status: Every Day     Types: Vaping w/o nicotine    Smokeless tobacco: Never   Substance and Sexual Activity    Alcohol use: Never    Drug use: Yes     Types: Marijuana     Comment: 3 x weekly    Sexual activity: Yes     Partners: Male     Birth control/protection: Condom        Family History   Problem Relation Name Age of Onset    Heart disease Mother      Heart attack Mother      Hypertension Father      COPD Sister      Intellectual disability Sister          Review of patient's allergies indicates:  No Known Allergies     Immunization History   Administered Date(s) Administered    HPV 9-Valent 02/22/2019, 09/16/2019, 03/06/2020    Hepatitis A / Hepatitis B 08/21/2009    Hepatitis A, Adult 04/17/2025    Hepatitis B (recombinant) Adjuvanted, 2 dose 04/17/2025    Influenza - Quadrivalent - PF (6-35 months) 03/06/2020    Influenza - Quadrivalent - PF *Preferred* (6 months and older) 10/02/2009, 10/30/2013    Influenza - Trivalent -  Afluria, Fluzone MDV 10/02/2009, 10/30/2013    Influenza - Trivalent - Fluarix, Flulaval, Fluzone, Afluria - PF 10/02/2009, 10/30/2013    Meningococcal Conjugate (MCV4P) 02/22/2019, 09/16/2019    Pneumococcal Conjugate - 13 Valent 06/02/2016    Pneumococcal Conjugate - 20 Valent 09/18/2024    Pneumococcal Conjugate - 7 Valent 01/18/2008    Pneumococcal Polysaccharide - 23 Valent 01/18/2008, 03/27/2017    Tdap 06/02/2016, 02/20/2025        Review of Systems   Constitutional: Negative.    HENT: Negative.     Eyes: Negative.    Respiratory: Negative.     Cardiovascular: Negative.    Gastrointestinal: Negative.    Genitourinary: Negative.    Musculoskeletal: Negative.    Skin: Negative.    Neurological: Negative.    Endo/Heme/Allergies: Negative.    Psychiatric/Behavioral: Negative.     All other systems reviewed and are negative.         Objective:      /89 (BP Location: Right arm, Patient Position: Sitting)   Pulse 77   Temp 98.1 °F (36.7 °C) (Oral)   Resp 10   Ht 5' (1.524 m)   Wt 80.2 kg (176 lb 12.8 oz)   BMI 34.53 kg/m²      Physical Exam  Vitals reviewed.   Constitutional:       General: She is not in acute distress.     Appearance: Normal appearance. She is not toxic-appearing.   HENT:      Mouth/Throat:      Mouth: Mucous membranes are moist.      Pharynx: Oropharynx is clear.   Eyes:      Conjunctiva/sclera: Conjunctivae normal.   Cardiovascular:      Rate and Rhythm: Normal rate and regular rhythm.   Pulmonary:      Effort: Pulmonary effort is normal. No respiratory distress.      Breath sounds: Normal breath sounds.   Abdominal:      General: Abdomen is flat. Bowel sounds are normal.      Palpations: Abdomen is soft.   Musculoskeletal:         General: Normal range of motion.      Cervical back: Normal range of motion.   Lymphadenopathy:      Cervical: No cervical adenopathy.   Skin:     General: Skin is warm and dry.   Neurological:      General: No focal deficit present.      Mental Status: She  is alert and oriented to person, place, and time. Mental status is at baseline.   Psychiatric:         Mood and Affect: Mood normal.         Behavior: Behavior normal.          Labs:   Lab Results   Component Value Date    WBC 8.55 04/17/2025    HGB 12.9 04/17/2025    HCT 38.9 04/17/2025    MCV 90.5 04/17/2025     04/17/2025       CMP  Sodium   Date Value Ref Range Status   04/17/2025 137 136 - 145 mmol/L Final     Potassium   Date Value Ref Range Status   04/17/2025 3.8 3.5 - 5.1 mmol/L Final     Chloride   Date Value Ref Range Status   04/17/2025 105 98 - 107 mmol/L Final     CO2   Date Value Ref Range Status   04/17/2025 24 22 - 29 mmol/L Final     Blood Urea Nitrogen   Date Value Ref Range Status   04/17/2025 7.6 (L) 9.8 - 20.1 mg/dL Final     Creatinine   Date Value Ref Range Status   04/17/2025 0.77 0.55 - 1.02 mg/dL Final     Calcium   Date Value Ref Range Status   04/17/2025 9.5 8.4 - 10.2 mg/dL Final     Albumin   Date Value Ref Range Status   04/17/2025 4.2 3.5 - 5.0 g/dL Final     Bilirubin Total   Date Value Ref Range Status   04/17/2025 0.4 <=1.5 mg/dL Final     ALP   Date Value Ref Range Status   04/17/2025 74 40 - 150 unit/L Final     AST   Date Value Ref Range Status   04/17/2025 14 11 - 45 unit/L Final     ALT   Date Value Ref Range Status   04/17/2025 8 0 - 55 unit/L Final     eGFR   Date Value Ref Range Status   04/17/2025 >60 mL/min/1.73/m2 Final     Comment:     Estimated GFR calculated using the CKD-EPI creatinine (2021) equation.     Lab Results   Component Value Date    TSH 0.654 09/18/2024     Hep C Ab Interp   Date Value Ref Range Status   09/18/2024 Nonreactive Nonreactive Final     Syphilis Antibody   Date Value Ref Range Status   09/18/2024 Nonreactive Nonreactive, Equivocal Final     Cholesterol Total   Date Value Ref Range Status   09/18/2024 190 <=200 mg/dL Final     HDL Cholesterol   Date Value Ref Range Status   09/18/2024 59 35 - 60 mg/dL Final     Triglyceride   Date Value  Ref Range Status   09/18/2024 98 37 - 140 mg/dL Final     Cholesterol/HDL Ratio   Date Value Ref Range Status   09/18/2024 3 0 - 5 Final     Very Low Density Lipoprotein   Date Value Ref Range Status   09/18/2024 20  Final     LDL Cholesterol   Date Value Ref Range Status   09/18/2024 111.00 50.00 - 140.00 mg/dL Final     Vitamin D   Date Value Ref Range Status   09/18/2024 25 (L) 30 - 80 ng/mL Final     Results for orders placed or performed in visit on 08/17/23   CD4 Lymphocytes   Result Value Ref Range    Patient Age 47     WBC Absolute 6,600 4,500 - 11,500 /mm3    Lymph Percent 39 28 - 48 %    Lymph Absolute 2,574 1,260 - 5,520 x10(3)/mcL    CD4 % 13.5 %    CD4 Absolute 347.49 (L) 589 - 1,505 unit/L    T Cell Interp       Normal absolute lymphocyte count with decreased absolute CD4+ lymphocyte count.    Rohit Navarro M.D.     Narrative    This test was developed and its performance characteristics determined by [facility]. It has not been cleared or approved by the US Food and Drug Administration. The FDA does not require this test to go through premarket FDA review. This test is used for clinical purposes. It should not be regarded as investigational or for research. This laboratory is certified under the Clinical Laboratory Improvement Amendments (CLIA) as qualified to perform high complexity clinical laboratory testing.     Results for orders placed or performed in visit on 09/18/24   HIV-1 RNA, Quantitative, PCR with Reflex to Genotype   Result Value Ref Range    HIV-1 RNA Detect/Quant, P Undetected Undetected copies/mL     Results for orders placed or performed in visit on 09/18/24   Quantiferon Gold TB   Result Value Ref Range    QuantiFERON-Tb Gold Plus Result Negative Negative    TB1 Ag minus Nil Result -0.02 IU/mL    TB2 Ag minus Nil Result -0.02 IU/mL    Mitogen minus Nil Result 7.18 IU/mL    Nil Result 0.05 IU/mL     Results for orders placed or performed in visit on  08/17/23   C.trach/N.gonor AMP RNA    Specimen: Throat   Result Value Ref Range    Chlamydia trachomatis amplified RNA Negative Negative    Neisseria gonorrhoeae amplified RNA Negative Negative    Source THROAT     SOURCE: THROAT      Results for orders placed or performed in visit on 03/25/24   Urinalysis   Result Value Ref Range    Color, UA Light-Yellow Yellow, Light-Yellow, Dark Yellow, Mary, Straw    Appearance, UA Turbid (A) Clear    Specific Gravity, UA 1.009 1.005 - 1.030    pH, UA 7.5 5.0 - 8.5    Protein, UA Negative Negative    Glucose, UA Normal Negative, Normal    Ketones, UA Negative Negative    Blood, UA Negative Negative    Bilirubin, UA Negative Negative    Urobilinogen, UA Normal 0.2, 1.0, Normal    Nitrites, UA 2+ (A) Negative    Leukocyte Esterase,  (A) Negative    WBC, UA 11-20 (A) None Seen, 0-2, 3-5, 0-5 /HPF    Bacteria, UA Trace (A) None Seen /HPF    Squamous Epithelial Cells, UA Few (A) None Seen /HPF    Hyaline Casts, UA None Seen None Seen /lpf    RBC, UA 0-5 None Seen, 0-2, 3-5, 0-5 /HPF       Imaging: Reviewed most recent relevant imaging studies available, notable results highlighted in this note    Medications:     Current Outpatient Medications   Medication Instructions    acetaminophen (TYLENOL) 500 mg, Oral, Every 8 hours PRN    bfbunmqwj-nniqhiui-jhdbgvs ala (BIKTARVY) -25 mg (25 kg or greater) 1 tablet, Oral, Daily    cyanocobalamin (VITAMIN B-12) 100 mcg    divalproex ER (DEPAKOTE ER) 500 mg, 3 times daily    doravirine 100 mg, Oral, Daily, Take together with Biktarvy.    ergocalciferol (ERGOCALCIFEROL) 50,000 Units, Oral, Every 7 days    FLUoxetine 40 mg    gabapentin (NEURONTIN) 300 mg, Oral, 3 times daily    meloxicam (MOBIC) 7.5 mg, Oral, Daily    senna-docusate 8.6-50 mg (SENNA WITH DOCUSATE SODIUM) 8.6-50 mg per tablet 1 tablet, Oral, Daily    VRAYLAR 1.5 mg       Assessment:       Problem List Items Addressed This Visit    None  Visit Diagnoses         HIV  disease    -  Primary    Relevant Medications    vhkeohdvc-hxpusyxe-pwtnaom ala (BIKTARVY) -25 mg (25 kg or greater)    doravirine 100 mg Tab    Other Relevant Orders    Comprehensive Metabolic Panel (Completed)    CBC Auto Differential (Completed)    CD4 Lymphocytes    HIV-1 RNA, Quantitative, PCR with Reflex to Genotype      Drug resistance to antiretroviral therapy        Relevant Medications    doravirine 100 mg Tab      Need for vaccination        Relevant Medications    Hep A (Havrix) IM vaccine (>/= 20 yo) (Completed)    hepatitis B (HEPLISAV-B) 20 mcg/0.5 mL vaccine 0.5 mL (Completed)      Encounter for screening mammogram for malignant neoplasm of breast        Relevant Orders    Mammo Digital Screening Bilat      Colon cancer screening        Relevant Orders    Cologuard Screening (Multitarget Stool DNA)      Vitamin D deficiency        Relevant Medications    ergocalciferol (ERGOCALCIFEROL) 50,000 unit Cap               Plan:      HIV disease  Drug resistance to antiretroviral therapy  -     ysiyigkdt-zyiebgtk-pkfgser ala (BIKTARVY) -25 mg (25 kg or greater); Take 1 tablet by mouth once daily.  Dispense: 30 tablet; Refill: 3  -     doravirine 100 mg Tab; Take 1 tablet (100 mg total) by mouth once daily. Take together with Biktarvy.  Dispense: 30 tablet; Refill: 3  -     Comprehensive Metabolic Panel; Future; Expected date: 04/17/2025  -     CBC Auto Differential; Future; Expected date: 04/17/2025  -     CD4 Lymphocytes; Future; Expected date: 04/17/2025  -     HIV-1 RNA, Quantitative, PCR with Reflex to Genotype; Future; Expected date: 04/17/2025  Diagnosed 7/24/2006.  CD4 tahmina <200 per pt's recollection, records not available (4/25).  HLA- negative (6/2011).  Meghan: 8/2006: NRTI: 214F, 333wt/E, PI: 46I, 63P.  7/22, 8/23, & 2/24: NRTI: K65E, II: T97A, PI: M46I.     Extensive adherence and sexual health counseling done.  Use condoms for all sexual encounters.  Blood precautions.  Continue  Biktarvy 1 po daily and Pifeltro 100 mg daily.   Labs today as ordered.  RTC 4 months with Amanda.      Wellness:  Cervical pap: ~ Dr. Hooker, 24 NIL  Anal pap: 2019 NIL  Cervical CT/GC:  Neg,   Anal CT/GC: 2019 Neg  Oral CT/GC: 2019 Neg,  Neg  Eye exam: 2019  MM/24 Benign  DEXA: N/A  Colon cancer screen: Cologuard not resulted 10/23. Repeat order placed .    Need for vaccination  -     Hep A (Havrix) IM vaccine (>/= 18 yo)  -     hepatitis B (HEPLISAV-B) 20 mcg/0.5 mL vaccine 0.5 mL  Hep A #1 today, #2 due 10/17/25-26.  Hep B booster dose today.     Encounter for screening mammogram for malignant neoplasm of breast  -     Mammo Digital Screening Bilat; Future; Expected date: 2025  MMG next available.     Colon cancer screening  -     Cologuard Screening (Multitarget Stool DNA); Future; Expected date: 2025  Cologuard next available.     Vitamin D deficiency  -     ergocalciferol (ERGOCALCIFEROL) 50,000 unit Cap; Take 1 capsule (50,000 Units total) by mouth every 7 days.  Dispense: 5 capsule; Refill: 3  Resume ergocalciferol 62913 units weekly.              I spent a total of  42  minutes on the day of the visit.  This includes face to face time and non-face to face time preparing to see the patient (eg, review of tests), obtaining and/or reviewing separately obtained history, documenting clinical information in the electronic or other health record, independently interpreting results and communicating results to the patient/family/caregiver, or care coordinator.

## 2025-04-17 NOTE — PROGRESS NOTES
Hepatitis A and Heplisav-B Vaccinations given IM right deltoid using aseptic tech. Patient tolerated well. To contact clinic prn.

## 2025-04-19 LAB — HIV1 RNA # PLAS NAA DL=20: NORMAL COPIES/ML

## 2025-04-21 LAB
AGE: 50
CD3+CD4+ CELLS # SPEC: 684 UNIT/L (ref 589–1505)
CD3+CD4+ CELLS NFR BLD: 20.5 %
LYMPHOCYTES # BLD AUTO: 3334.5 X10(3)/MCL (ref 1260–5520)
LYMPHOCYTES NFR LN MANUAL: 39 % (ref 28–48)
LYMPHOMA - T-CELL MARKERS SPEC-IMP: NORMAL
WBC # BLD AUTO: 8550 /MM3 (ref 4500–11500)

## 2025-08-02 NOTE — DISCHARGE INSTRUCTIONS
Follow-up in the outpatient setting and return as needed.   · Keep follow up appointment [facility].     ` Resume home medications unless otherwise instructed by your doctor.    · No heavy lifting or straining over 10 pounds for 2 weeks.    · You may take a shower starting tomorrow evening. Wash GENTLY with soap and water (do not scrub) over incision, rinse with water, and pat dry.    · Do not soak your wound in water for two weeks. Do not take baths, swim, or use a hot tub until your doctor says it is okay.    · Use pain medication as instructed.    · You may use an ice pack as needed for 20 minutes at a time over your incision site to minimize swelling and help relieve pain.    · Do not drink alcohol or drive today, or as long as you are on pain medication.    · Notify MD of any moderate to severe pain unrelieved by pain medicine, if your incision opens and/or bleeds, or for any signs of infection including fever above 100.4, excessive redness or swelling, yellow/green foul- smelling drainage, nausea or vomiting. Clinics number is [phone]. If it is after business hours or emergency call [phone] and state Im having post op complications and need to speak to the surgeon on call.    · Thanks for choosing [facility]! Have a smooth recovery!

## (undated) DEVICE — TOURNIQUET SB QC DP 18X4IN

## (undated) DEVICE — GLOVE SENSICARE PI GRN 7.5

## (undated) DEVICE — GLOVE SIGNATURE MICRO LTX 7.5

## (undated) DEVICE — GLOVE SIGNATURE MICRO LTX 6.5

## (undated) DEVICE — SPONGE GAUZE 16PLY 4X4

## (undated) DEVICE — ADHESIVE DERMABOND ADVANCED

## (undated) DEVICE — SYR 10CC LUER LOCK

## (undated) DEVICE — PADDING WYTEX UNDRCST 4INX4YD

## (undated) DEVICE — GOWN POLY REINF BRTH SLV XL

## (undated) DEVICE — DRESSING GAUZE XEROFORM 5X9

## (undated) DEVICE — GLOVE SIGNATURE MICRO LTX 8

## (undated) DEVICE — BANDAGE ESMARK ELASTIC ST 4X9

## (undated) DEVICE — DRAPE HAND STERILE

## (undated) DEVICE — ELECTRODE REM POLYHESIVE II

## (undated) DEVICE — KIT SURGICAL TURNOVER

## (undated) DEVICE — GLOVE SIGNATURE MICRO LTX 7

## (undated) DEVICE — DRAPE ORTH SPLIT 77X108IN

## (undated) DEVICE — GLOVE SENSICARE PI GRN 6

## (undated) DEVICE — DRAPE C-ARM COVER EZ 36X28IN

## (undated) DEVICE — BUCKET PLASTER DISPOSABLE

## (undated) DEVICE — BANDAGE SWIFTWRAP ELAS 4INX5YD

## (undated) DEVICE — STAPLER SKIN COUNT 35 W STPL

## (undated) DEVICE — GLOVE SENSICARE PI GRN 8

## (undated) DEVICE — SUT MCRYL PLUS 4-0 PS2 27IN

## (undated) DEVICE — PENCIL ELECSURG ROCKER 15FT

## (undated) DEVICE — KIT BASIC ORTHO UNIVERSITY

## (undated) DEVICE — SUT VICRYL 3-0 27 SH

## (undated) DEVICE — SUT ETHILON 4-0 PS2 18 BLK

## (undated) DEVICE — NDL HYPO REG 25G X 1 1/2

## (undated) DEVICE — INNATE STERILE INSTRUMENT KITTHE KIT CONTAINS TWO GUIDE WIRES, ONE CANNULATED DRILL, AND ONE CANNULATED DRIVER.
Type: IMPLANTABLE DEVICE | Site: FINGER | Status: NON-FUNCTIONAL
Brand: INNATE INSTRUMENT KIT
Removed: 2025-02-27

## (undated) DEVICE — SUT 3-0 VICRYL / SH (J416)

## (undated) DEVICE — SPONGE GAUZE 4X4 12 PLY STRL

## (undated) DEVICE — ELECTRODE PATIENT RETURN DISP

## (undated) DEVICE — SUT 2/0 30IN SILK BLK BRAI

## (undated) DEVICE — MANIFOLD 4 PORT

## (undated) DEVICE — APPLICATOR CHLORAPREP ORN 26ML

## (undated) DEVICE — GLOVE SENSICARE PI GRN 6.5

## (undated) DEVICE — GLOVE SENSICARE PI GRN 8.5

## (undated) DEVICE — SOL 9P NACL IRR PIC IL

## (undated) DEVICE — CORD BIPOLAR 12 FOOT

## (undated) DEVICE — SOL NACL IRR 1000ML BTL

## (undated) DEVICE — Device

## (undated) DEVICE — SUT ETHILON 3-0 FS-1 30

## (undated) DEVICE — ALCOHOL 70% ISO RUBBING 16OZ

## (undated) DEVICE — GOWN POLY REINF BRTH SLV LG

## (undated) DEVICE — GLOVE SIGNATURE MICRO LTX 6

## (undated) DEVICE — SPLINT PLASTER FAST SET 5X30IN

## (undated) DEVICE — GLOVE SENSICARE PI GRN 7

## (undated) DEVICE — GLOVE SIGNATURE ESSNTL LTX 6.5

## (undated) DEVICE — GOWN POLY REINF X-LONG 2XL

## (undated) DEVICE — BLADE SURG STAINLESS STEEL #15